# Patient Record
Sex: MALE | Race: WHITE | NOT HISPANIC OR LATINO
[De-identification: names, ages, dates, MRNs, and addresses within clinical notes are randomized per-mention and may not be internally consistent; named-entity substitution may affect disease eponyms.]

---

## 2017-02-15 ENCOUNTER — APPOINTMENT (OUTPATIENT)
Dept: PULMONOLOGY | Facility: CLINIC | Age: 72
End: 2017-02-15

## 2017-02-15 VITALS
DIASTOLIC BLOOD PRESSURE: 70 MMHG | SYSTOLIC BLOOD PRESSURE: 110 MMHG | RESPIRATION RATE: 17 BRPM | BODY MASS INDEX: 28.92 KG/M2 | HEART RATE: 95 BPM | HEIGHT: 70 IN | WEIGHT: 202 LBS | OXYGEN SATURATION: 98 %

## 2017-02-15 RX ORDER — AMOXICILLIN 500 MG/1
500 CAPSULE ORAL
Qty: 20 | Refills: 0 | Status: DISCONTINUED | COMMUNITY
Start: 2016-11-21

## 2017-06-02 ENCOUNTER — FORM ENCOUNTER (OUTPATIENT)
Age: 72
End: 2017-06-02

## 2017-06-03 ENCOUNTER — OUTPATIENT (OUTPATIENT)
Dept: OUTPATIENT SERVICES | Facility: HOSPITAL | Age: 72
LOS: 1 days | End: 2017-06-03
Payer: MEDICARE

## 2017-06-03 ENCOUNTER — APPOINTMENT (OUTPATIENT)
Dept: CT IMAGING | Facility: CLINIC | Age: 72
End: 2017-06-03

## 2017-06-03 DIAGNOSIS — R93.8 ABNORMAL FINDINGS ON DIAGNOSTIC IMAGING OF OTHER SPECIFIED BODY STRUCTURES: ICD-10-CM

## 2017-06-03 PROCEDURE — 71250 CT THORAX DX C-: CPT

## 2017-06-15 ENCOUNTER — APPOINTMENT (OUTPATIENT)
Dept: PULMONOLOGY | Facility: CLINIC | Age: 72
End: 2017-06-15

## 2017-06-15 VITALS
RESPIRATION RATE: 14 BRPM | HEIGHT: 70 IN | HEART RATE: 82 BPM | DIASTOLIC BLOOD PRESSURE: 60 MMHG | WEIGHT: 206 LBS | SYSTOLIC BLOOD PRESSURE: 115 MMHG | OXYGEN SATURATION: 96 % | BODY MASS INDEX: 29.49 KG/M2

## 2017-06-15 VITALS
BODY MASS INDEX: 29.49 KG/M2 | DIASTOLIC BLOOD PRESSURE: 70 MMHG | HEART RATE: 86 BPM | WEIGHT: 206 LBS | SYSTOLIC BLOOD PRESSURE: 120 MMHG | RESPIRATION RATE: 16 BRPM | HEIGHT: 70 IN | OXYGEN SATURATION: 97 %

## 2017-06-15 DIAGNOSIS — J90 PLEURAL EFFUSION, NOT ELSEWHERE CLASSIFIED: ICD-10-CM

## 2017-06-15 RX ORDER — FLUTICASONE PROPIONATE 0.5 MG/G
0.05 CREAM TOPICAL
Qty: 60 | Refills: 0 | Status: ACTIVE | COMMUNITY
Start: 2017-04-24

## 2017-06-15 RX ORDER — METRONIDAZOLE 10 MG/G
1 GEL TOPICAL
Qty: 60 | Refills: 0 | Status: ACTIVE | COMMUNITY
Start: 2017-03-02

## 2017-06-15 RX ORDER — CLOBETASOL PROPIONATE 0.5 MG/G
0.05 CREAM TOPICAL
Qty: 60 | Refills: 0 | Status: ACTIVE | COMMUNITY
Start: 2016-08-30

## 2017-06-15 RX ORDER — ECONAZOLE NITRATE 10 MG/G
1 CREAM TOPICAL
Qty: 30 | Refills: 0 | Status: ACTIVE | COMMUNITY
Start: 2017-05-31

## 2017-06-15 RX ORDER — TOBRAMYCIN 3 MG/G
0.3 OINTMENT OPHTHALMIC
Qty: 4 | Refills: 0 | Status: ACTIVE | COMMUNITY
Start: 2017-03-02

## 2017-08-11 ENCOUNTER — APPOINTMENT (OUTPATIENT)
Dept: ORTHOPEDIC SURGERY | Facility: CLINIC | Age: 72
End: 2017-08-11
Payer: MEDICARE

## 2017-08-11 VITALS
DIASTOLIC BLOOD PRESSURE: 80 MMHG | BODY MASS INDEX: 29.49 KG/M2 | HEIGHT: 70 IN | HEART RATE: 89 BPM | SYSTOLIC BLOOD PRESSURE: 147 MMHG | WEIGHT: 206 LBS

## 2017-08-11 PROCEDURE — 99213 OFFICE O/P EST LOW 20 MIN: CPT

## 2017-11-20 ENCOUNTER — APPOINTMENT (OUTPATIENT)
Dept: PULMONOLOGY | Facility: CLINIC | Age: 72
End: 2017-11-20
Payer: MEDICARE

## 2017-11-20 VITALS
HEART RATE: 79 BPM | DIASTOLIC BLOOD PRESSURE: 76 MMHG | OXYGEN SATURATION: 98 % | SYSTOLIC BLOOD PRESSURE: 120 MMHG | BODY MASS INDEX: 29.49 KG/M2 | WEIGHT: 206 LBS | RESPIRATION RATE: 17 BRPM | HEIGHT: 70 IN

## 2017-11-20 PROCEDURE — 99214 OFFICE O/P EST MOD 30 MIN: CPT | Mod: 25

## 2017-11-20 PROCEDURE — 94010 BREATHING CAPACITY TEST: CPT

## 2018-06-11 ENCOUNTER — APPOINTMENT (OUTPATIENT)
Dept: PULMONOLOGY | Facility: CLINIC | Age: 73
End: 2018-06-11
Payer: MEDICARE

## 2018-06-11 VITALS
HEART RATE: 75 BPM | SYSTOLIC BLOOD PRESSURE: 130 MMHG | DIASTOLIC BLOOD PRESSURE: 70 MMHG | BODY MASS INDEX: 28.92 KG/M2 | RESPIRATION RATE: 16 BRPM | OXYGEN SATURATION: 98 % | WEIGHT: 202 LBS | HEIGHT: 70 IN

## 2018-06-11 PROCEDURE — 94010 BREATHING CAPACITY TEST: CPT

## 2018-06-11 PROCEDURE — 99214 OFFICE O/P EST MOD 30 MIN: CPT | Mod: 25

## 2018-07-25 ENCOUNTER — APPOINTMENT (OUTPATIENT)
Dept: ORTHOPEDIC SURGERY | Facility: CLINIC | Age: 73
End: 2018-07-25
Payer: MEDICARE

## 2018-07-25 PROCEDURE — 20610 DRAIN/INJ JOINT/BURSA W/O US: CPT | Mod: RT

## 2018-08-17 ENCOUNTER — FORM ENCOUNTER (OUTPATIENT)
Age: 73
End: 2018-08-17

## 2018-08-18 ENCOUNTER — APPOINTMENT (OUTPATIENT)
Dept: CT IMAGING | Facility: CLINIC | Age: 73
End: 2018-08-18
Payer: MEDICARE

## 2018-08-18 ENCOUNTER — OUTPATIENT (OUTPATIENT)
Dept: OUTPATIENT SERVICES | Facility: HOSPITAL | Age: 73
LOS: 1 days | End: 2018-08-18
Payer: MEDICARE

## 2018-08-18 DIAGNOSIS — Z00.8 ENCOUNTER FOR OTHER GENERAL EXAMINATION: ICD-10-CM

## 2018-08-18 PROCEDURE — 71250 CT THORAX DX C-: CPT

## 2018-08-18 PROCEDURE — 71250 CT THORAX DX C-: CPT | Mod: 26

## 2018-08-20 ENCOUNTER — APPOINTMENT (OUTPATIENT)
Dept: ORTHOPEDIC SURGERY | Facility: CLINIC | Age: 73
End: 2018-08-20
Payer: MEDICARE

## 2018-08-20 DIAGNOSIS — M54.12 RADICULOPATHY, CERVICAL REGION: ICD-10-CM

## 2018-08-20 DIAGNOSIS — M54.2 CERVICALGIA: ICD-10-CM

## 2018-08-20 PROCEDURE — 99212 OFFICE O/P EST SF 10 MIN: CPT

## 2018-10-09 ENCOUNTER — APPOINTMENT (OUTPATIENT)
Dept: PULMONOLOGY | Facility: CLINIC | Age: 73
End: 2018-10-09
Payer: MEDICARE

## 2018-10-09 VITALS
OXYGEN SATURATION: 96 % | DIASTOLIC BLOOD PRESSURE: 70 MMHG | HEART RATE: 69 BPM | HEIGHT: 70 IN | WEIGHT: 192 LBS | SYSTOLIC BLOOD PRESSURE: 140 MMHG | BODY MASS INDEX: 27.49 KG/M2

## 2018-10-09 PROCEDURE — 99214 OFFICE O/P EST MOD 30 MIN: CPT | Mod: 25

## 2018-10-09 PROCEDURE — 71046 X-RAY EXAM CHEST 2 VIEWS: CPT

## 2018-10-09 RX ORDER — DICLOFENAC SODIUM AND MISOPROSTOL 75; 200 MG/1; UG/1
75-0.2 TABLET, DELAYED RELEASE ORAL
Qty: 30 | Refills: 0 | Status: DISCONTINUED | COMMUNITY
Start: 2018-06-01

## 2018-10-09 RX ORDER — TOBRAMYCIN AND DEXAMETHASONE 3; 1 MG/ML; MG/ML
0.3-0.1 SUSPENSION/ DROPS OPHTHALMIC
Qty: 5 | Refills: 0 | Status: DISCONTINUED | COMMUNITY
Start: 2018-07-17

## 2018-10-09 RX ORDER — CEPHALEXIN 250 MG/1
250 CAPSULE ORAL
Qty: 40 | Refills: 0 | Status: DISCONTINUED | COMMUNITY
Start: 2018-07-18

## 2018-10-09 RX ORDER — MISOPROSTOL 200 UG/1
200 TABLET ORAL
Qty: 60 | Refills: 0 | Status: DISCONTINUED | COMMUNITY
Start: 2018-05-03

## 2018-10-09 NOTE — ASSESSMENT
[FreeTextEntry1] : Mr. Blair is currently in the midst of an acute bronchitis/ asthmatic bronchitis. \par \par Problem : Acute Bronchitis\par - Add Biaxin 500 mg BID (20)\par - You have a clinical scenario most c/q acute bronchitis the etiology of which is unknown but empiric antibiotics are indicated. Hydration, mucolytics including mucinex, robitussin and the like are indicated. Cough controlling agents will be needed. \par \par problem 1: Asthmatic Bronchitis \par - Add a course of prednisone: 20 mg for 7 days then 10 mg for 7 days \par Information sheet given to the patient to be reviewed, this medication is never to be used without consulting the prescribing physician. Proper dietary restraint is necessary specifically salt containing foods, if any reaction may occur should be reported. \par \par -continue to use Symbicort 80 or 160 at 2 inhalations BID\par -Asthma is believed to be caused by inherited (genetic) and environmental factor, but its exact cause is unknown. Asthma may be triggered by allergens, lung infections, or irritants in the air. Asthma triggers are different for each person\par -Inhaler technique reviewed as well as oral hygiene techniques reviewed with patient. Avoidance of cold air, extremes of temperature, rescue inhaler should be used before exercise. Order of medication reviewed with patient. Recommended use of a cool mist humidifier in the bedroom.\par \par problem 2: abnormal chest CT improved c/w inflammation \par -follow up chest CT in May 2018 or 18 months \par -confirmed hernia of the chest consistent with prior lung surgery \par \par problem 3: ? ALINE\par -based on his fatigue, EDS, questionable snoring, and elevated Mallampati class\par -he is being recommended to have an at home sleep study\par -recommended Oxy-Aid or Provent \par -Discussed the risks/associations with coronary artery disease, atrial fibrillation, arrhythmia, memory loss, issues with concentration, stroke risk, hypertension, nocturia, chronic reflux/Madera’s esophagus some but not all inclusive. Treatment options discussed including CPAP/BiPAP machine, oral appliance, ProVent therapy, Oxy-Aid by Respitec, new technologies, or positional sleep.\par -Treatment options discussed including CPAP/BiPAP machine, oral appliance, ProVent therapy, Oxy-Aid by Respitec, new technologies, or positional sleep.Recommended use of the CPAP machine for moderate (AHI >15), moderate to severe (AHI 15-30) and severe patients (AHI > 30). Recommended weight loss which can reduce AHI especially in weight loss of greater than 5% of BMI. Positional sleep is recommended in those with low AHI, low-moderate BMI, and younger age. For severe sleep apnea, the hypoglossal nerve stimulator was recommended as well.\par \par problem 4: allergic rhinitis \par -continue to use Astelin .15 1 sniff each nostril BID\par -continue to use OTC antihistamines \par \par -Environmental measures for allergies were encouraged including mattress and pillow cover, air purifier, and environmental controls.\par \par problem 5: overweight\par -Weight loss, exercise, and diet control were discussed and are highly encouraged. Treatment options were given such as, aqua therapy, and contacting a nutritionist. Recommended to use the elliptical, stationary bike, less use of treadmill. Obesity is associated with worsening asthma, shortness of breath, and potential for cardiac disease, diabetes, and other underlying medical conditions.\par \par problem 6: GERD\par -continue to use Omeprazole 40 mg before breakfast\par - He has had to balance between NSAID and Arthrotec \par -Rule of 2s: avoid eating too much, eating too late, eating too spicy, eating two hours before bed\par -Things to avoid including overeating, spicy foods, tight clothing, eating within three hours of bed, this list is not all inclusive. \par -For treatment of reflux, possible options discussed including diet control, H2 blockers, PPIs, as well as coating motility agents discussed as treatment options. Timing of meals and proximity of last meal to sleep were discussed. If symptoms persist, a formal gastrointestinal evaluation is needed. \par \par problem 7: chest indentation\par -felt to be related to prior surgery\par -he is being recommended to have cardiac therapy\par \par problem 8: health maintenance \par -recommended yearly flu shot after October 15\par -recommended strep pneumonia vaccines: Prevnar-13 vaccine, followed by Pneumo vaccine 23 one year following\par -recommended early intervention for URIs\par -recommended regular osteoporosis evaluations\par -recommended early dermatological evaluations\par -recommended after the age of 50 to the age of 70, colonoscopy every 5 years \par \par problem 9: poor sleep hygiene\par -recommended to wear sunglasses 30 minutes before bed \par -Good sleep hygiene was encouraged including avoiding watching television an hour before bed, keeping caffeine at a low,  avoiding reading, television, or anything, in bed, no drinking any liquids three hours before bedtime, and only getting into bed when tired and ready for sleep. \par \par F/U in 3 months\par He is encouraged to call with any changes, concerns, or questions. \par

## 2018-10-09 NOTE — PHYSICAL EXAM

## 2018-10-09 NOTE — ADDENDUM
[FreeTextEntry1] : Documented by Arnav Avila acting as a scribe for Dr. Sanford Muse on 10/9/18\par \par All medical record entries made by the Scribe were at my, Dr. Sanford Muse's, direction and personally dictated by me on 10/9/18. I have reviewed the chart and agree that the record accurately reflects my personal performance of the history, physical exam, assessment and plan. I have also personally directed, reviewed, and agree with the discharge instructions. \par \par \par \par \par

## 2018-10-09 NOTE — PROCEDURE
[FreeTextEntry1] : CXR revealed a normal sized heart (s/p OHS); there was no evidence of infiltrate or effusion.

## 2018-12-12 ENCOUNTER — APPOINTMENT (OUTPATIENT)
Dept: PULMONOLOGY | Facility: CLINIC | Age: 73
End: 2018-12-12
Payer: MEDICARE

## 2018-12-12 ENCOUNTER — NON-APPOINTMENT (OUTPATIENT)
Age: 73
End: 2018-12-12

## 2018-12-12 VITALS
SYSTOLIC BLOOD PRESSURE: 140 MMHG | RESPIRATION RATE: 16 BRPM | OXYGEN SATURATION: 99 % | BODY MASS INDEX: 26.88 KG/M2 | DIASTOLIC BLOOD PRESSURE: 70 MMHG | HEART RATE: 59 BPM | HEIGHT: 71 IN | WEIGHT: 192 LBS

## 2018-12-12 PROCEDURE — 99214 OFFICE O/P EST MOD 30 MIN: CPT | Mod: 25

## 2018-12-12 PROCEDURE — 94010 BREATHING CAPACITY TEST: CPT

## 2018-12-12 RX ORDER — PREDNISONE 10 MG/1
10 TABLET ORAL
Qty: 50 | Refills: 0 | Status: DISCONTINUED | COMMUNITY
Start: 2018-10-09 | End: 2018-12-12

## 2018-12-12 RX ORDER — CLARITHROMYCIN 500 MG/1
500 TABLET, FILM COATED ORAL
Qty: 20 | Refills: 0 | Status: DISCONTINUED | COMMUNITY
Start: 2018-10-09 | End: 2018-12-12

## 2018-12-12 NOTE — ASSESSMENT
[FreeTextEntry1] : Mr. Blair is s/p acute bronchitis/ asthmatic bronchitis, with histry of asthma, Allergy, GERD, ?OSAS - now stable. \par \par problem 1: Asthma\par -continue to use Symbicort 80 or 160 at 2 inhalations BID\par -Asthma is believed to be caused by inherited (genetic) and environmental factor, but its exact cause is unknown. Asthma may be triggered by allergens, lung infections, or irritants in the air. Asthma triggers are different for each person\par -Inhaler technique reviewed as well as oral hygiene techniques reviewed with patient. Avoidance of cold air, extremes of temperature, rescue inhaler should be used before exercise. Order of medication reviewed with patient. Recommended use of a cool mist humidifier in the bedroom.\par \par problem 2: abnormal chest CT improved c/w inflammation \par -follow up chest CT in May 2019 or 18 months \par -confirmed hernia of the chest consistent with prior lung surgery \par \par problem 3: ? ALINE\par -based on his fatigue, EDS, questionable snoring, and elevated Mallampati class\par -he is being recommended to have an at home sleep study\par -recommended Oxy-Aid or Provent \par -Discussed the risks/associations with coronary artery disease, atrial fibrillation, arrhythmia, memory loss, issues with concentration, stroke risk, hypertension, nocturia, chronic reflux/Madera’s esophagus some but not all inclusive. Treatment options discussed including CPAP/BiPAP machine, oral appliance, ProVent therapy, Oxy-Aid by Respitec, new technologies, or positional sleep.\par -Treatment options discussed including CPAP/BiPAP machine, oral appliance, ProVent therapy, Oxy-Aid by Respitec, new technologies, or positional sleep.Recommended use of the CPAP machine for moderate (AHI >15), moderate to severe (AHI 15-30) and severe patients (AHI > 30). Recommended weight loss which can reduce AHI especially in weight loss of greater than 5% of BMI. Positional sleep is recommended in those with low AHI, low-moderate BMI, and younger age. For severe sleep apnea, the hypoglossal nerve stimulator was recommended as well.\par \par problem 4: allergic rhinitis \par -continue to use Astelin .15 1 sniff each nostril BID\par -continue to use OTC antihistamines \par \par -Environmental measures for allergies were encouraged including mattress and pillow cover, air purifier, and environmental controls.\par \par problem 5: overweight\par -Weight loss, exercise, and diet control were discussed and are highly encouraged. Treatment options were given such as, aqua therapy, and contacting a nutritionist. Recommended to use the elliptical, stationary bike, less use of treadmill. Obesity is associated with worsening asthma, shortness of breath, and potential for cardiac disease, diabetes, and other underlying medical conditions.\par \par problem 6: GERD\par -continue to use Omeprazole 40 mg before breakfast\par - He has had to balance between NSAID and Arthrotec \par -Rule of 2s: avoid eating too much, eating too late, eating too spicy, eating two hours before bed\par -Things to avoid including overeating, spicy foods, tight clothing, eating within three hours of bed, this list is not all inclusive. \par -For treatment of reflux, possible options discussed including diet control, H2 blockers, PPIs, as well as coating motility agents discussed as treatment options. Timing of meals and proximity of last meal to sleep were discussed. If symptoms persist, a formal gastrointestinal evaluation is needed. \par \par problem 7: chest indentation\par -felt to be related to prior surgery\par -he is being recommended to have cardiac therapy\par \par problem 8: health maintenance \par -recommended yearly flu shot after October 2018\par -recommended strep pneumonia vaccines: Prevnar-13 vaccine, followed by Pneumo vaccine 23 one year following\par -recommended early intervention for URIs\par -recommended regular osteoporosis evaluations\par -recommended early dermatological evaluations\par -recommended after the age of 50 to the age of 70, colonoscopy every 5 years \par \par problem 9: poor sleep hygiene\par -recommended to wear sunglasses 30 minutes before bed \par -Good sleep hygiene was encouraged including avoiding watching television an hour before bed, keeping caffeine at a low,  avoiding reading, television, or anything, in bed, no drinking any liquids three hours before bedtime, and only getting into bed when tired and ready for sleep. \par \par F/U in 3 months\par He is encouraged to call with any changes, concerns, or questions. \par

## 2018-12-12 NOTE — PHYSICAL EXAM

## 2018-12-12 NOTE — PROCEDURE
[FreeTextEntry1] : PFT- spi reveals mild restrictive dysfunction; FEV1 of 2.62 L which is 77% of predicted; normal flow volume loop.\par

## 2018-12-12 NOTE — HISTORY OF PRESENT ILLNESS
[FreeTextEntry1] : Mr. Blair is a 72 year old male with a history of abnormal chest CT, allergies, asthma, and chronic cough presenting to the office today for a sick visit . His chief complaint is GI discomfort. \par - He states in Oct 2018 he has two episodes of vomiting containing bile, was evaluated, per him he thought it was related to his gallbladder. He is inquiring whether his symptoms were relates to his bronchitis. \par - He notes feeling well generally. \par - He reports marijuana therapy did not help. \par - He states that he feels 10 years older\par - His GERd symptoms are under control with Omeprazole. \par - He notes he was walking regularly before he began feeling ill in Oct. \par - He denies any headaches, nausea, vomiting, fever, chills, sweats, chest pain, chest pressure, diarrhea, constipation, dysphagia, dizziness, leg swelling, leg pain, itchy eyes, itchy ears, heartburn, reflux, or sour taste in the mouth.\par

## 2018-12-12 NOTE — REASON FOR VISIT
[Acute] : an acute visit [FreeTextEntry1] : s/p Acute Bronchitis/ Asthmatic Bronchitis. - now fine.

## 2018-12-12 NOTE — ADDENDUM
[FreeTextEntry1] : Documented by Sandra Eldridge acting as a scribe for Dr. Sanford Muse on 12/12/18.\par \par All medical record entries made by the scribe, Sandra Eldridge, were at my, Dr. Sanford Muse's, direction and personally dictated by me on 12/12/18. I have reviewed the chart and agree that the record accurately reflects my personal performance of the history, physical exam, assessment and plan. I have also personally directed, reviewed, and agree with the discharge instructions.\par

## 2019-05-07 ENCOUNTER — NON-APPOINTMENT (OUTPATIENT)
Age: 74
End: 2019-05-07

## 2019-05-07 ENCOUNTER — APPOINTMENT (OUTPATIENT)
Dept: PULMONOLOGY | Facility: CLINIC | Age: 74
End: 2019-05-07
Payer: MEDICARE

## 2019-05-07 VITALS
WEIGHT: 198 LBS | OXYGEN SATURATION: 96 % | SYSTOLIC BLOOD PRESSURE: 120 MMHG | HEIGHT: 71 IN | BODY MASS INDEX: 27.72 KG/M2 | HEART RATE: 63 BPM | DIASTOLIC BLOOD PRESSURE: 60 MMHG | RESPIRATION RATE: 14 BRPM

## 2019-05-07 PROCEDURE — 94010 BREATHING CAPACITY TEST: CPT

## 2019-05-07 PROCEDURE — 99214 OFFICE O/P EST MOD 30 MIN: CPT | Mod: 25

## 2019-05-07 NOTE — PROCEDURE
[FreeTextEntry1] : PFT- spi reveals normal flows; FEV1 was 2.69L which is 80% of predicted; normal flow volume loop

## 2019-05-07 NOTE — ADDENDUM
[FreeTextEntry1] : Documented by Arnav Avila acting as a scribe for Dr. Sanford Muse on 5/7/2019\par \par All medical record entries made by the Scribe were at my, Dr. Sanford Muse's, direction and personally dictated by me on 5/7/2019. I have reviewed the chart and agree that the record accurately reflects my personal performance of the history, physical exam, assessment and plan. I have also personally directed, reviewed, and agree with the discharge instructions. \par \par \par \par \par

## 2019-05-07 NOTE — ASSESSMENT
[FreeTextEntry1] : Mr. Blair has a history of HTN, cervical radiculopathy, asthma, allergy, GERD, and ?OSAS. He is currently stable from a pulmonary perspective. \par \par problem 1: Asthma\par -continue to use Symbicort 80 or 160 at 2 inhalations BID\par -Asthma is believed to be caused by inherited (genetic) and environmental factor, but its exact cause is unknown. Asthma may be triggered by allergens, lung infections, or irritants in the air. Asthma triggers are different for each person\par -Inhaler technique reviewed as well as oral hygiene techniques reviewed with patient. Avoidance of cold air, extremes of temperature, rescue inhaler should be used before exercise. Order of medication reviewed with patient. Recommended use of a cool mist humidifier in the bedroom.\par \par problem 2: abnormal chest CT improved c/w inflammation \par -follow up chest CT in 10/2019\par -confirmed hernia of the chest consistent with prior lung surgery \par \par problem 3: ? ALINE\par -based on his fatigue, EDS, questionable snoring, and elevated Mallampati class\par -he is being recommended to have an at home sleep study\par -recommended Oxy-Aid or Provent \par -Discussed the risks/associations with coronary artery disease, atrial fibrillation, arrhythmia, memory loss, issues with concentration, stroke risk, hypertension, nocturia, chronic reflux/[place]era’s esophagus some but not all inclusive. Treatment options discussed including CPAP/BiPAP machine, oral appliance, ProVent therapy, Oxy-Aid by Respitec, new technologies, or positional sleep.\par -Treatment options discussed including CPAP/BiPAP machine, oral appliance, ProVent therapy, Oxy-Aid by Respitec, new technologies, or positional sleep.Recommended use of the CPAP machine for moderate (AHI >15), moderate to severe (AHI 15-30) and severe patients (AHI > 30). Recommended weight loss which can reduce AHI especially in weight loss of greater than 5% of BMI. Positional sleep is recommended in those with low AHI, low-moderate BMI, and younger age. For severe sleep apnea, the hypoglossal nerve stimulator was recommended as well.\par \par problem 4: allergic rhinitis \par -continue to use Astelin .15 1 sniff each nostril BID\par -continue to use OTC antihistamines \par \par -Environmental measures for allergies were encouraged including mattress and pillow cover, air purifier, and environmental controls.\par \par problem 5: overweight\par -Weight loss, exercise, and diet control were discussed and are highly encouraged. Treatment options were given such as, aqua therapy, and contacting a nutritionist. Recommended to use the elliptical, stationary bike, less use of treadmill. Obesity is associated with worsening asthma, shortness of breath, and potential for cardiac disease, diabetes, and other underlying medical conditions.\par \par problem 6: GERD\par -continue to use Omeprazole 40 mg before breakfast\par - He has had to balance between NSAID and Arthrotec \par -Rule of 2s: avoid eating too much, eating too late, eating too spicy, eating two hours before bed\par -Things to avoid including overeating, spicy foods, tight clothing, eating within three hours of bed, this list is not all inclusive. \par -For treatment of reflux, possible options discussed including diet control, H2 blockers, PPIs, as well as coating motility agents discussed as treatment options. Timing of meals and proximity of last meal to sleep were discussed. If symptoms persist, a formal gastrointestinal evaluation is needed. \par \par problem 7: chest indentation\par -felt to be related to prior surgery\par -he is being recommended to have cardiac therapy\par \par problem 8: health maintenance \par -recommended yearly flu shot after October 2018\par -recommended strep pneumonia vaccines: Prevnar-13 vaccine, followed by Pneumo vaccine 23 one year following\par -recommended early intervention for URIs\par -recommended regular osteoporosis evaluations\par -recommended early dermatological evaluations\par -recommended after the age of 50 to the age of 70, colonoscopy every 5 years \par \par problem 9: poor sleep hygiene\par -recommended to wear sunglasses 30 minutes before bed \par -Good sleep hygiene was encouraged including avoiding watching television an hour before bed, keeping caffeine at a low,  avoiding reading, television, or anything, in bed, no drinking any liquids three hours before bedtime, and only getting into bed when tired and ready for sleep. \par \par F/U in 4 months\par He is encouraged to call with any changes, concerns, or questions. \par

## 2019-05-07 NOTE — PHYSICAL EXAM
[General Appearance - Well Developed] : well developed [Normal Appearance] : normal appearance [Well Groomed] : well groomed [No Deformities] : no deformities [General Appearance - In No Acute Distress] : no acute distress [General Appearance - Well Nourished] : well nourished [Eyelids - No Xanthelasma] : the eyelids demonstrated no xanthelasmas [Normal Conjunctiva] : the conjunctiva exhibited no abnormalities [Normal Oropharynx] : normal oropharynx [III] : III [Neck Cervical Mass (___cm)] : no neck mass was observed [Neck Appearance] : the appearance of the neck was normal [Jugular Venous Distention Increased] : there was no jugular-venous distention [Thyroid Diffuse Enlargement] : the thyroid was not enlarged [Heart Rate And Rhythm] : heart rate and rhythm were normal [Thyroid Nodule] : there were no palpable thyroid nodules [Heart Sounds] : normal S1 and S2 [Respiration, Rhythm And Depth] : normal respiratory rhythm and effort [Exaggerated Use Of Accessory Muscles For Inspiration] : no accessory muscle use [Abdomen Tenderness] : non-tender [Abdomen Soft] : soft [Abdomen Mass (___ Cm)] : no abdominal mass palpated [Abnormal Walk] : normal gait [Nail Clubbing] : no clubbing of the fingernails [Gait - Sufficient For Exercise Testing] : the gait was sufficient for exercise testing [Petechial Hemorrhages (___cm)] : no petechial hemorrhages [Cyanosis, Localized] : no localized cyanosis [Skin Color & Pigmentation] : normal skin color and pigmentation [No Venous Stasis] : no venous stasis [] : no rash [Skin Lesions] : no skin lesions [No Xanthoma] : no  xanthoma was observed [No Skin Ulcers] : no skin ulcer [Sensation] : the sensory exam was normal to light touch and pinprick [Deep Tendon Reflexes (DTR)] : deep tendon reflexes were 2+ and symmetric [Oriented To Time, Place, And Person] : oriented to person, place, and time [No Focal Deficits] : no focal deficits [Impaired Insight] : insight and judgment were intact [Affect] : the affect was normal [Murmurs] : no murmurs present [FreeTextEntry1] : I:E 1:3; clear  [FreeTextEntry2] : thinning of the palmar muscles.

## 2019-05-07 NOTE — HISTORY OF PRESENT ILLNESS
[FreeTextEntry1] : Mr. Blair is a 73 year old male with a history of asthma, allergy, GERD, abnormal CT, ?OSAS presenting to the office today for a follow up visit . His chief complaint is total body pain. \par - He comes in stating that he has been feeling terrible\par - He recently tried taking marijuana pills but did not find that it helped. \par - He states that he feels 20 years older since discontinuing diclofenac. Since discontinuing his diclofenac, he has been having body aches and pains and has not been able to ambulate as well as possible. His posture has also worsened in response\par - Around 3 weeks ago, while sleeping he woke up and during a transition from the supine position to a standing position he began to collapse. He was able to correct his fall though he continued to be unable to stand without assistance. He notes, though, he felt nauseous the next day. \par - He notes that he had these episodes in the past, which was treated with Zolofot.

## 2019-10-11 ENCOUNTER — FORM ENCOUNTER (OUTPATIENT)
Age: 74
End: 2019-10-11

## 2019-10-12 ENCOUNTER — APPOINTMENT (OUTPATIENT)
Dept: CT IMAGING | Facility: CLINIC | Age: 74
End: 2019-10-12
Payer: MEDICARE

## 2019-10-12 ENCOUNTER — OUTPATIENT (OUTPATIENT)
Dept: OUTPATIENT SERVICES | Facility: HOSPITAL | Age: 74
LOS: 1 days | End: 2019-10-12
Payer: MEDICARE

## 2019-10-12 DIAGNOSIS — R93.89 ABNORMAL FINDINGS ON DIAGNOSTIC IMAGING OF OTHER SPECIFIED BODY STRUCTURES: ICD-10-CM

## 2019-10-12 PROCEDURE — 71250 CT THORAX DX C-: CPT

## 2019-10-12 PROCEDURE — 71250 CT THORAX DX C-: CPT | Mod: 26

## 2019-11-04 ENCOUNTER — APPOINTMENT (OUTPATIENT)
Dept: PULMONOLOGY | Facility: CLINIC | Age: 74
End: 2019-11-04
Payer: MEDICARE

## 2019-11-04 ENCOUNTER — NON-APPOINTMENT (OUTPATIENT)
Age: 74
End: 2019-11-04

## 2019-11-04 VITALS
HEIGHT: 70 IN | RESPIRATION RATE: 17 BRPM | BODY MASS INDEX: 28.2 KG/M2 | OXYGEN SATURATION: 98 % | DIASTOLIC BLOOD PRESSURE: 70 MMHG | HEART RATE: 54 BPM | SYSTOLIC BLOOD PRESSURE: 130 MMHG | WEIGHT: 197 LBS

## 2019-11-04 PROCEDURE — 94010 BREATHING CAPACITY TEST: CPT

## 2019-11-04 PROCEDURE — 99214 OFFICE O/P EST MOD 30 MIN: CPT | Mod: 25

## 2019-11-04 RX ORDER — NAPROXEN 500 MG/1
500 TABLET, DELAYED RELEASE ORAL
Qty: 60 | Refills: 0 | Status: DISCONTINUED | COMMUNITY
Start: 2017-05-05 | End: 2019-11-04

## 2019-11-04 RX ORDER — NAPROXEN 500 MG/1
500 TABLET ORAL
Qty: 60 | Refills: 0 | Status: DISCONTINUED | COMMUNITY
Start: 2017-04-19 | End: 2019-11-04

## 2019-11-04 RX ORDER — OMEPRAZOLE 20 MG/1
20 CAPSULE, DELAYED RELEASE ORAL
Qty: 60 | Refills: 0 | Status: DISCONTINUED | COMMUNITY
Start: 2018-06-13 | End: 2019-11-04

## 2019-11-04 RX ORDER — SUCRALFATE 1 G/1
1 TABLET ORAL
Qty: 120 | Refills: 3 | Status: ACTIVE | COMMUNITY
Start: 2019-11-04 | End: 1900-01-01

## 2019-11-04 NOTE — ADDENDUM
[FreeTextEntry1] : Documented by Catherine Garcia acting as a scribe for Dr. Sanford Muse on 11/04/2019 \par \par All medical record entries made by the Scribe were at my, Dr. Sanford Muse's, direction and personally dictated by me on 11/04/2019 . I have reviewed the chart and agree that the record accurately reflects my personal performance of the history, physical exam, assessment and plan. I have also personally directed, reviewed, and agree with the discharge instructions.\par

## 2019-11-04 NOTE — PROCEDURE
[FreeTextEntry1] : PFT revealed normal flows, with a FEV1 of 2.56 L, which is 76 % of predicted, with a normal flow volume loop. \par \par Rossy: Chest CT (10/12/19) reveals stable 4 mm left upper lobe pulmonary nodule when compared to prior studies dating back to May \par 2016. No new nodules.\par

## 2019-11-04 NOTE — HISTORY OF PRESENT ILLNESS
[FreeTextEntry1] : Mr. Blair is a 73 year old male with a history of asthma, allergy, GERD, abnormal CT, ?OSAS presenting to the office today for a follow up visit . His chief complaint is\par \par - He states that his annual CT scan recently and he is not satisfied with the report \par - He recently had a lubricant shot for his arthritic pain \par - He is currently off the Omeprazole\par - Triglycerides are elevated. \par - He used marijuana in the past to alleviate his pain. \par - He states she has pain from head to toe especially with his back. \par - he currently has orthostatic hypotension \par - He has a dry mouth side effect as a side effect result of his medication \par - His dizziness is well controlled by his medication \par - He states that he has been wheezing and coughing intermittently \par - He states that he was given a  by his dentist for his sleep apnea \par - He states that he has some irritation on his frenulum as a result of the mouth guard\par \par - denies any headaches, nausea, vomiting, fever, chills, sweats, chest pain, chest pressure, diarrhea, constipation, dysphagia,  leg swelling, leg pain, itchy eyes, itchy ears, heartburn, reflux, or sour taste in the mouth.\par

## 2019-11-04 NOTE — ASSESSMENT
[FreeTextEntry1] : Mr. Blair has a history of HTN, cervical radiculopathy, asthma, allergy, GERD, and ?OSAS. He is currently plagued by body pain, intermittent cough/wheezing/ dry mouth. \par \par problem 1: Asthma\par -continue to use Symbicort 80 or 160 at 2 inhalations BID\par -Asthma is believed to be caused by inherited (genetic) and environmental factor, but its exact cause is unknown. Asthma may be triggered by allergens, lung infections, or irritants in the air. Asthma triggers are different for each person\par -Inhaler technique reviewed as well as oral hygiene techniques reviewed with patient. Avoidance of cold air, extremes of temperature, rescue inhaler should be used before exercise. Order of medication reviewed with patient. Recommended use of a cool mist humidifier in the bedroom.\par \par problem 2: abnormal chest CT improved c/w inflammation \par -follow up chest CT in 10/2020\par -confirmed hernia of the chest consistent with prior lung surgery \par \par problem 3: ? ALINE\par -based on his fatigue, EDS, questionable snoring, and elevated Mallampati class\par -he is being recommended to have an at home sleep study\par -recommended Oxy-Aid or Provent \par -Discussed the risks/associations with coronary artery disease, atrial fibrillation, arrhythmia, memory loss, issues with concentration, stroke risk, hypertension, nocturia, chronic reflux/Madera’s esophagus some but not all inclusive. Treatment options discussed including CPAP/BiPAP machine, oral appliance, ProVent therapy, Oxy-Aid by [employer]pite[employer], new technologies, or positional sleep.\par -Treatment options discussed including CPAP/BiPAP machine, oral appliance, ProVent therapy, Oxy-Aid by [employer]pite[employer], new technologies, or positional sleep.Recommended use of the CPAP machine for moderate (AHI >15), moderate to severe (AHI 15-30) and severe patients (AHI > 30). Recommended weight loss which can reduce AHI especially in weight loss of greater than 5% of BMI. Positional sleep is recommended in those with low AHI, low-moderate BMI, and younger age. For severe sleep apnea, the hypoglossal nerve stimulator was recommended as well.\par \par problem 4: allergic rhinitis \par -continue to use Astelin .15 1 sniff each nostril BID\par -continue to use OTC antihistamines \par \par -Environmental measures for allergies were encouraged including mattress and pillow cover, air purifier, and environmental controls.\par \par problem 5: overweight\par -Weight loss, exercise, and diet control were discussed and are highly encouraged. Treatment options were given such as, aqua therapy, and contacting a nutritionist. Recommended to use the elliptical, stationary bike, less use of treadmill. Obesity is associated with worsening asthma, shortness of breath, and potential for cardiac disease, diabetes, and other underlying medical conditions.\par \par problem 6: GERD\par -continue to use Pepcid Complete 40 mg QHS\par - add Carafate 1pill  pre-meal and qhs \par -Rule of 2s: avoid eating too much, eating too late, eating too spicy, eating two hours before bed\par -Things to avoid including overeating, spicy foods, tight clothing, eating within three hours of bed, this list is not all inclusive. \par -For treatment of reflux, possible options discussed including diet control, H2 blockers, PPIs, as well as coating motility agents discussed as treatment options. Timing of meals and proximity of last meal to sleep were discussed. If symptoms persist, a formal gastrointestinal evaluation is needed. \par \par problem 7: chest indentation\par -felt to be related to prior surgery\par -he is being recommended to have cardiac therapy\par \par problem 8: health maintenance \par -recommended yearly flu shot after October 2018 - refused \par -recommended strep pneumonia vaccines: Prevnar-13 vaccine, followed by Pneumo vaccine 23 one year following\par -recommended early intervention for URIs\par -recommended regular osteoporosis evaluations\par -recommended early dermatological evaluations\par -recommended after the age of 50 to the age of 70, colonoscopy every 5 years \par \par problem 9: poor sleep hygiene ( in place) \par -recommended to wear sunglasses 30 minutes before bed \par -Good sleep hygiene was encouraged including avoiding watching television an hour before bed, keeping caffeine at a low,  avoiding reading, television, or anything, in bed, no drinking any liquids three hours before bedtime, and only getting into bed when tired and ready for sleep. \par \par F/U in 4 months\par He is encouraged to call with any changes, concerns, or questions. \par

## 2019-11-04 NOTE — PHYSICAL EXAM

## 2019-12-11 ENCOUNTER — APPOINTMENT (OUTPATIENT)
Dept: ORTHOPEDIC SURGERY | Facility: CLINIC | Age: 74
End: 2019-12-11
Payer: MEDICARE

## 2019-12-11 PROCEDURE — 99213 OFFICE O/P EST LOW 20 MIN: CPT

## 2020-01-29 ENCOUNTER — APPOINTMENT (OUTPATIENT)
Dept: ORTHOPEDIC SURGERY | Facility: CLINIC | Age: 75
End: 2020-01-29
Payer: MEDICARE

## 2020-01-29 PROCEDURE — 20610 DRAIN/INJ JOINT/BURSA W/O US: CPT | Mod: RT

## 2020-01-29 PROCEDURE — 99213 OFFICE O/P EST LOW 20 MIN: CPT | Mod: 25

## 2020-04-20 ENCOUNTER — APPOINTMENT (OUTPATIENT)
Dept: PULMONOLOGY | Facility: CLINIC | Age: 75
End: 2020-04-20
Payer: MEDICARE

## 2020-04-20 PROCEDURE — 99214 OFFICE O/P EST MOD 30 MIN: CPT | Mod: 95

## 2020-04-20 RX ORDER — FLUTICASONE PROPIONATE AND SALMETEROL 250; 50 UG/1; UG/1
250-50 POWDER RESPIRATORY (INHALATION)
Qty: 1 | Refills: 1 | Status: ACTIVE | COMMUNITY
Start: 2020-04-20 | End: 1900-01-01

## 2020-04-20 NOTE — REASON FOR VISIT
[Follow-Up] : a follow-up visit [FreeTextEntry1] : Video Telehealth - Asthma, allergy, GERD, abnormal CT, ?OSAS

## 2020-04-20 NOTE — ASSESSMENT
[FreeTextEntry1] : Mr. Blair has a history of HTN, cervical radiculopathy, asthma, allergy, GERD, and ?OSAS. He is currently plagued by body pain, intermittent cough/Post Nasal Drip Syndrome. \par \par problem 1: Asthma\par -Add Generic Advair 232 1 inhalation BID\par -Asthma is believed to be caused by inherited (genetic) and environmental factor, but its exact cause is unknown. Asthma may be triggered by allergens, lung infections, or irritants in the air. Asthma triggers are different for each person\par -Inhaler technique reviewed as well as oral hygiene techniques reviewed with patient. Avoidance of cold air, extremes of temperature, rescue inhaler should be used before exercise. Order of medication reviewed with patient. Recommended use of a cool mist humidifier in the bedroom.\par \par problem 2: abnormal chest CT improved c/w inflammation \par -follow up chest CT in 10/2020\par -confirmed hernia of the chest consistent with prior lung surgery \par \par problem 3: ? ALINE\par -based on his fatigue, EDS, questionable snoring, and elevated Mallampati class\par -he is being recommended to have an at home sleep study\par -recommended Oxy-Aid or Provent \par -Discussed the risks/associations with coronary artery disease, atrial fibrillation, arrhythmia, memory loss, issues with concentration, stroke risk, hypertension, nocturia, chronic reflux/Madera’s esophagus some but not all inclusive. Treatment options discussed including CPAP/BiPAP machine, oral appliance, ProVent therapy, Oxy-Aid by [employer]pite[employer], new technologies, or positional sleep.\par -Treatment options discussed including CPAP/BiPAP machine, oral appliance, ProVent therapy, Oxy-Aid by [employer]pite[employer], new technologies, or positional sleep.Recommended use of the CPAP machine for moderate (AHI >15), moderate to severe (AHI 15-30) and severe patients (AHI > 30). Recommended weight loss which can reduce AHI especially in weight loss of greater than 5% of BMI. Positional sleep is recommended in those with low AHI, low-moderate BMI, and younger age. For severe sleep apnea, the hypoglossal nerve stimulator was recommended as well.\par \par problem 4: allergic rhinitis \par -continue to use Astelin .15 1 sniff each nostril BID\par -continue to use OTC antihistamines / Clarinex 5mg QHS \par \par -Environmental measures for allergies were encouraged including mattress and pillow cover, air purifier, and environmental controls.\par \par problem 5: overweight\par -Weight loss, exercise, and diet control were discussed and are highly encouraged. Treatment options were given such as, aqua therapy, and contacting a nutritionist. Recommended to use the elliptical, stationary bike, less use of treadmill. Obesity is associated with worsening asthma, shortness of breath, and potential for cardiac disease, diabetes, and other underlying medical conditions.\par \par problem 6: GERD\par -continue to use Pepcid 40 mg QHS\par -continue Carafate 1pill  pre-meal and qhs \par -Rule of 2s: avoid eating too much, eating too late, eating too spicy, eating two hours before bed\par -Things to avoid including overeating, spicy foods, tight clothing, eating within three hours of bed, this list is not all inclusive. \par -For treatment of reflux, possible options discussed including diet control, H2 blockers, PPIs, as well as coating motility agents discussed as treatment options. Timing of meals and proximity of last meal to sleep were discussed. If symptoms persist, a formal gastrointestinal evaluation is needed. \par \par problem 7: chest indentation\par -felt to be related to prior surgery\par -he is being recommended to have cardiac therapy\par \par Problem 8: Health Maintenance/COVID19 Precautions:\par - Clean your hands often. Wash your hands often with soap and water for at least 20 seconds, especially after blowing your nose, coughing, or sneezing, or having been in a public place.\par - If soap and water are not available, use a hand  that contains at least 60% alcohol.\par - To the extent possible, avoid touching high-touch surfaces in public places - elevator buttons, door handles, handrails, handshaking with people, etc. Use a tissue or your sleeve to cover your hand or finger if you must touch something.\par - Wash your hands after touching surfaces in public places.\par - Avoid touching your face, nose, eyes, etc.\par - Clean and disinfect your home to remove germs: practice routine cleaning of frequently touched surfaces (for example: tables, doorknobs, light switches, handles, desks, toilets, faucets, sinks & cell phones)\par - Avoid crowds, especially in poorly ventilated spaces. Your risk of exposure to respiratory viruses like COVID-19 may increase in crowded, closed-in settings with little air circulation if\par there are people in the crowd who are sick. All patients are recommended to practice social distancing and stay at least 6 feet away from others.\par - Avoid all non-essential travel including plane trips, and especially avoid embarking on cruise ships.\par -If COVID-19 is spreading in your community, take extra measures to put distance between yourself and other people to further reduce your risk of being exposed to this new virus.\par -Stay home as much as possible.\par - Consider ways of getting food brought to your house through family, social, or commercial networks\par -Be aware that the virus has been known to live in the air up to 3 hours post exposure, cardboard up to 24 hours post exposure, copper up to 4 hours post exposure, steel and plastic up to 2-3 days post exposure. Risk of transmission from these surfaces are affected by many variables.\par \par Immune Support Recommendations:\par -OTC Vitamin C 500mg BID \par -OTC Quercetin 250-500mg BID \par -OTC Zinc 75-100mg per day \par -OTC Melatonin 1or 2mg a night \par -OTC Vitamin D 1-4000mg per day\par -Tonic Water 8oz\par -Recommended to Stay Hydrated (At least a gallon/day)\par \par Asthma and COVID19:\par You need to make sure your asthma is under control. This often requires the use of inhaled corticosteroids (and sometimes oral corticosteroids). Inhaled corticosteroids do not likely reduce your immune system’s ability to fight infections, but oral corticosteroids may. It is important to use the steps above to protect yourself to limit your exposure to any respiratory virus. \par \par problem 9: health maintenance \par -recommended yearly flu shot after October 2018 - refused \par -recommended strep pneumonia vaccines: Prevnar-13 vaccine, followed by Pneumo vaccine 23 one year following\par -recommended early intervention for URIs\par -recommended regular osteoporosis evaluations\par -recommended early dermatological evaluations\par -recommended after the age of 50 to the age of 70, colonoscopy every 5 years \par \par problem 9: poor sleep hygiene ( in place) \par -recommended to wear sunglasses 30 minutes before bed \par -Good sleep hygiene was encouraged including avoiding watching television an hour before bed, keeping caffeine at a low,  avoiding reading, television, or anything, in bed, no drinking any liquids three hours before bedtime, and only getting into bed when tired and ready for sleep. \par \par F/U in 4 months\par He is encouraged to call with any changes, concerns, or questions. \par

## 2020-04-20 NOTE — HISTORY OF PRESENT ILLNESS
[Home] : at home, [unfilled] , at the time of the visit. [Medical Office: ([facility])___] : at the medical office located in  [Patient] : the patient [Self] : self [FreeTextEntry2] : TIFFANIE CEE  [FreeTextEntry1] : Mr. Blair is a 73 year old male with a history of asthma, allergy, GERD, abnormal CT, ?OSAS video calls to the office today for a follow up visit . His chief complaint is\par -he notes that His bowels are regular. \par -heartburn happens but it is controlled with medication use. \par -he reports that his weight is stable \par -has not been exercising. \par -has been coughing \par -not sure if he is wheezing. \par -has been having lingering Sore throat for the past 3 weeks. \par -dry interm intent cough happens everyday. coughs couple of times \par -deep breath do not trigger the cough. \par -no fever, takes temperature every other day. \par -has nasal congestion\par -no rhinorrhea \par -Sense of Taste is good. \par -Sense of Smell is good. \par -not using anything for his sinuses. \par -smells a "vinegary smell"\par \par -He denies any chest pain, chest pressure, diarrhea, constipation, dysphagia, dizziness, sour taste in the mouth, leg swelling, leg pain, itchy eyes, itchy ears, heartburn, reflux, myalgias or arthralgias.

## 2020-04-20 NOTE — ADDENDUM
[FreeTextEntry1] : Documented by Mikey Barnett acting as a scribe for Dr. Sanford Muse on 04/20/2020 \par \par All medical record entries made by the Scribe were at my, Dr. Sanford Muse's, direction and personally dictated by me on 04/20/2020 . I have reviewed the chart and agree that the record accurately reflects my personal performance of the history, physical exam, assessment and plan. I have also personally directed, reviewed, and agree with the discharge instructions.

## 2020-04-20 NOTE — PHYSICAL EXAM
[No Acute Distress] : no acute distress [Well Nourished] : well nourished [Normal Appearance] : normal appearance [Well Groomed] : well groomed [Well Developed] : well developed [No Deformities] : no deformities [TextBox_2] : Appears Well

## 2020-04-23 RX ORDER — DESLORATADINE 5 MG/1
5 TABLET ORAL DAILY
Qty: 90 | Refills: 1 | Status: DISCONTINUED | COMMUNITY
Start: 2020-04-20 | End: 2020-04-23

## 2020-05-19 ENCOUNTER — APPOINTMENT (OUTPATIENT)
Dept: PULMONOLOGY | Facility: CLINIC | Age: 75
End: 2020-05-19
Payer: MEDICARE

## 2020-05-19 PROCEDURE — 99442: CPT | Mod: 95

## 2020-05-19 NOTE — REVIEW OF SYSTEMS
[Negative] : Endocrine [Sore Throat] : sore throat [Cough] : cough [Nasal Congestion] : nasal congestion [Seasonal Allergies] : seasonal allergies

## 2020-05-20 NOTE — HISTORY OF PRESENT ILLNESS
[Home] : at home, [unfilled] , at the time of the visit. [Medical Office: ([facility])___] : at the medical office located in  [Patient] : the patient [Self] : self [FreeTextEntry2] : Jah Melo  [FreeTextEntry1] : Mr. Blair is a 73 year old male with a history of asthma, allergy, GERD, abnormal CT, ?OSAS telephone calls to the office today for a follow up visit . His chief complaint is \par - He has an intermittent dry cough about 6 times a day which has been going on for 2 months\par - He has had a sore throat and swollen glands that come and go \par - He has some nasal congestion \par - He is not bringing up anything with the cough \par - He has recently gained 5 lbs \par - No palpitations\par - He has itchy ears \par - He is doing 2 puffs of Symbicort qd which has not made a significant difference\par - He uses Claritin D occasionally \par - denies any headaches, nausea, vomiting, fever, chills, sweats, chest pain, chest pressure, diarrhea, constipation, dysphagia, dizziness, leg swelling, leg pain, itchy eyes, heartburn, reflux, or sour taste in the mouth.\par \par \par

## 2020-05-20 NOTE — ASSESSMENT
[FreeTextEntry1] : Mr. Blair is a 74 year old male who has a history of HTN, cervical radiculopathy, asthma, allergy, GERD, and ?OSAS. He is currently plagued by body pain, intermittent cough/Post Nasal Drip Syndrome (residual cough). \par \par problem 1: Asthma\par - add Singulair 10 mg QHS\par -Continue Generic Advair 232 2 inhalation BID\par -Asthma is believed to be caused by inherited (genetic) and environmental factor, but its exact cause is unknown. Asthma may be triggered by allergens, lung infections, or irritants in the air. Asthma triggers are different for each person\par -Inhaler technique reviewed as well as oral hygiene techniques reviewed with patient. Avoidance of cold air, extremes of temperature, rescue inhaler should be used before exercise. Order of medication reviewed with patient. Recommended use of a cool mist humidifier in the bedroom.\par \par problem 2: abnormal chest CT improved c/w inflammation \par -follow up chest CT in 10/2020\par -confirmed hernia of the chest consistent with prior lung surgery \par \par problem 3: ? ALINE\par -based on his fatigue, EDS, questionable snoring, and elevated Mallampati class\par -he is being recommended to have an at home sleep study\par -recommended Oxy-Aid or Provent \par -Discussed the risks/associations with coronary artery disease, atrial fibrillation, arrhythmia, memory loss, issues with concentration, stroke risk, hypertension, nocturia, chronic reflux/Madera’s esophagus some but not all inclusive. Treatment options discussed including CPAP/BiPAP machine, oral appliance, ProVent therapy, Oxy-Aid by [employer]pitec, new technologies, or positional sleep.\par -Treatment options discussed including CPAP/BiPAP machine, oral appliance, ProVent therapy, Oxy-Aid by [employer]pite[employer], new technologies, or positional sleep.Recommended use of the CPAP machine for moderate (AHI >15), moderate to severe (AHI 15-30) and severe patients (AHI > 30). Recommended weight loss which can reduce AHI especially in weight loss of greater than 5% of BMI. Positional sleep is recommended in those with low AHI, low-moderate BMI, and younger age. For severe sleep apnea, the hypoglossal nerve stimulator was recommended as well.\par \par problem 4: allergic rhinitis \par - add Xyzal 5 mg QHS\par -continue to use Astelin .15 1 sniff each nostril BID\par -continue to use OTC antihistamines / Clarinex 5mg QHS \par -Environmental measures for allergies were encouraged including mattress and pillow cover, air purifier, and environmental controls.\par \par problem 5: overweight\par -Weight loss, exercise, and diet control were discussed and are highly encouraged. Treatment options were given such as, aqua therapy, and contacting a nutritionist. Recommended to use the elliptical, stationary bike, less use of treadmill. Obesity is associated with worsening asthma, shortness of breath, and potential for cardiac disease, diabetes, and other underlying medical conditions.\par \par problem 6: GERD\par -continue to use Pepcid 40 mg QHS\par -continue Carafate 1pill  pre-meal and qhs \par -Rule of 2s: avoid eating too much, eating too late, eating too spicy, eating two hours before bed\par -Things to avoid including overeating, spicy foods, tight clothing, eating within three hours of bed, this list is not all inclusive. \par -For treatment of reflux, possible options discussed including diet control, H2 blockers, PPIs, as well as coating motility agents discussed as treatment options. Timing of meals and proximity of last meal to sleep were discussed. If symptoms persist, a formal gastrointestinal evaluation is needed. \par \par problem 7: chest indentation\par -felt to be related to prior surgery\par -he is being recommended to have cardiac therapy\par \par Problem 8: Health Maintenance/COVID19 Precautions:\par -OTC Vitamin C 500mg BID \par -OTC Quercetin 250-500mg BID \par -OTC Zinc 75-100mg per day \par -OTC Melatonin 1or 2mg a night \par -OTC Vitamin D 1-4000mg per day\par -Tonic Water 8oz\par -Recommended to Stay Hydrated (At least a gallon/day)\par \par problem 9: health maintenance \par -recommended yearly flu shot after October 2018 - refused \par -recommended strep pneumonia vaccines: Prevnar-13 vaccine, followed by Pneumo vaccine 23 one year following\par -recommended early intervention for URIs\par -recommended regular osteoporosis evaluations\par -recommended early dermatological evaluations\par -recommended after the age of 50 to the age of 70, colonoscopy every 5 years \par \par problem 9: poor sleep hygiene ( in place) \par -recommended to wear sunglasses 30 minutes before bed \par -Good sleep hygiene was encouraged including avoiding watching television an hour before bed, keeping caffeine at a low,  avoiding reading, television, or anything, in bed, no drinking any liquids three hours before bedtime, and only getting into bed when tired and ready for sleep. \par \par F/U in 4 months\par He is encouraged to call with any changes, concerns, or questions. \par

## 2020-05-20 NOTE — ADDENDUM
[FreeTextEntry1] : Documented by Leeanne Weaver acting as a scribe for Dr. Sanford Muse on (05/19/2020).\par \par All medical record entries made by the Scribe were at my, Dr. Sanford Muse's, direction and personally dictated by me on (05/19/2020). I have reviewed the chart and agree that the record accurately reflects my personal performance of the history, physical exam, assessment and plan. I have also personally directed, reviewed, and agree with the discharge instructions. \par \par \par

## 2020-05-20 NOTE — REASON FOR VISIT
[Follow-Up] : a follow-up visit [FreeTextEntry1] : telephonic - Asthma, allergy, GERD, abnormal CT, ?OSAS

## 2020-07-15 ENCOUNTER — APPOINTMENT (OUTPATIENT)
Dept: PULMONOLOGY | Facility: CLINIC | Age: 75
End: 2020-07-15
Payer: MEDICARE

## 2020-07-15 DIAGNOSIS — J45.909 UNSPECIFIED ASTHMA, UNCOMPLICATED: ICD-10-CM

## 2020-07-15 PROCEDURE — 99214 OFFICE O/P EST MOD 30 MIN: CPT | Mod: 95

## 2020-07-15 RX ORDER — DOXYCYCLINE HYCLATE 100 MG/1
100 CAPSULE ORAL
Qty: 20 | Refills: 0 | Status: DISCONTINUED | COMMUNITY
Start: 2020-04-20 | End: 2020-07-15

## 2020-07-15 NOTE — ASSESSMENT
[FreeTextEntry1] : Mr. Blair is a 74 year old male who has a history of HTN, cervical radiculopathy, asthma, allergy, GERD, and ?OSAS. He was spoken to via video, and is currently plagued by intermittent cough/Post Nasal Drip Syndrome (residual cough) - still present \par \par problem 1: Asthma\par -continue Singulair 10 mg QHS\par -Hold on Symbicort - Med Holiday. \par -Asthma is believed to be caused by inherited (genetic) and environmental factor, but its exact cause is unknown. Asthma may be triggered by allergens, lung infections, or irritants in the air. Asthma triggers are different for each person\par -Inhaler technique reviewed as well as oral hygiene techniques reviewed with patient. Avoidance of cold air, extremes of temperature, rescue inhaler should be used before exercise. Order of medication reviewed with patient. Recommended use of a cool mist humidifier in the bedroom.\par \par Problem 1A: Thoat Sx\par -Throat Coat Tea \par \par problem 2: abnormal chest CT improved c/w inflammation \par -follow up chest CT in 10/2020\par -confirmed hernia of the chest consistent with prior lung surgery \par \par problem 3: ? ALINE\par -based on his fatigue, EDS, questionable snoring, and elevated Mallampati class\par -he is being recommended to have an at home sleep study\par -recommended Oxy-Aid or Provent \par -Discussed the risks/associations with coronary artery disease, atrial fibrillation, arrhythmia, memory loss, issues with concentration, stroke risk, hypertension, nocturia, chronic reflux/[place]’s esophagus some but not all inclusive. Treatment options discussed including CPAP/BiPAP machine, oral appliance, ProVent therapy, Oxy-Aid by [employer]pite[employer], new technologies, or positional sleep.\par -Treatment options discussed including CPAP/BiPAP machine, oral appliance, ProVent therapy, Oxy-Aid by [employer], new technologies, or positional sleep.Recommended use of the CPAP machine for moderate (AHI >15), moderate to severe (AHI 15-30) and severe patients (AHI > 30). Recommended weight loss which can reduce AHI especially in weight loss of greater than 5% of BMI. Positional sleep is recommended in those with low AHI, low-moderate BMI, and younger age. For severe sleep apnea, the hypoglossal nerve stimulator was recommended as well.\par \par problem 4: allergic rhinitis \par - add Xyzal 5 mg QHS\par -continue to use Astelin .15 1 sniff each nostril BID\par -continue to use OTC antihistamines / Clarinex 5mg QHS \par -Environmental measures for allergies were encouraged including mattress and pillow cover, air purifier, and environmental controls.\par \par problem 5: overweight\par -Weight loss, exercise, and diet control were discussed and are highly encouraged. Treatment options were given such as, aqua therapy, and contacting a nutritionist. Recommended to use the elliptical, stationary bike, less use of treadmill. Obesity is associated with worsening asthma, shortness of breath, and potential for cardiac disease, diabetes, and other underlying medical conditions.\par \par problem 6: GERD\par -continue to use Pepcid 40 mg QHS\par -continue Carafate 1pill  pre-meal and qhs \par -Rule of 2s: avoid eating too much, eating too late, eating too spicy, eating two hours before bed\par -Things to avoid including overeating, spicy foods, tight clothing, eating within three hours of bed, this list is not all inclusive. \par -For treatment of reflux, possible options discussed including diet control, H2 blockers, PPIs, as well as coating motility agents discussed as treatment options. Timing of meals and proximity of last meal to sleep were discussed. If symptoms persist, a formal gastrointestinal evaluation is needed. \par \par problem 7: chest indentation\par -felt to be related to prior surgery\par -he is being recommended to have cardiac therapy\par \par Problem 8: Health Maintenance/COVID19 Precautions:\par -OTC Vitamin C 500mg BID \par -OTC Quercetin 250-500mg BID \par -OTC Zinc 75-100mg per day \par -OTC Melatonin 1or 2mg a night \par -OTC Vitamin D 1-4000mg per day\par -Tonic Water 8oz\par -Recommended to Stay Hydrated (At least a gallon/day)\par \par problem 9: health maintenance \par -recommended yearly flu shot after October 2018 - refused \par -recommended strep pneumonia vaccines: Prevnar-13 vaccine, followed by Pneumo vaccine 23 one year following\par -recommended early intervention for URIs\par -recommended regular osteoporosis evaluations\par -recommended early dermatological evaluations\par -recommended after the age of 50 to the age of 70, colonoscopy every 5 years \par \par problem 9: poor sleep hygiene ( in place) \par -recommended to wear sunglasses 30 minutes before bed \par -Good sleep hygiene was encouraged including avoiding watching television an hour before bed, keeping caffeine at a low,  avoiding reading, television, or anything, in bed, no drinking any liquids three hours before bedtime, and only getting into bed when tired and ready for sleep. \par \par F/U in 4 months\par He is encouraged to call with any changes, concerns, or questions. \par

## 2020-07-15 NOTE — HISTORY OF PRESENT ILLNESS
[Home] : at home, [unfilled] , at the time of the visit. [Medical Office: ([facility])___] : at the medical office located in  [Verbal consent obtained from patient] : the patient, [unfilled] [FreeTextEntry1] : Mr. Blair is a 74 year old male with a history of asthma, allergy, GERD, abnormal CT, ?OSAS video calls to the office today for a follow up visit . His chief complaint is chronic cough. \par -he states that he is not feeling any better than his last visit. \par -reports chest pressure. \par -he states that he has this "froggy" sound in his voice. \par -denies any fever. \par -feels achy but has been trying to exercise.\par -Sense of Taste is good. \par -Sense of Smell is good. \par -he is concerned with the medications he is on. \par -if he goes up the stairs too quickly, he becomes tired and feels slightly SOB. \par -he states that he is not wheezing. \par -reports coughing half a dozen times 1-2 times a day. \par -unsure where the cough is coming from. \par -gets a tickle in his throat but does not think it is a sore throat. \par -nothing exciting happening in his throat. \par -he reports that he is not brining up any mucus with his cough. \par -denies any fevers, chills, or sweats. \par -reports some hoarseness. \par -he states that the cough is not prominent. \par \par -he denies any diarrhea, constipation, dysphagia, dizziness, sour taste in the mouth, leg swelling, leg pain, itchy eyes, itchy ears, heartburn, reflux, myalgias or arthralgias.

## 2020-07-15 NOTE — END OF VISIT
[FreeTextEntry3] : I have spent 15 minutes of time on the phone with the patient with their consent.  [Time Spent: ___ minutes] : I have spent [unfilled] minutes of time on the encounter.

## 2020-07-15 NOTE — REVIEW OF SYSTEMS
[Nasal Congestion] : nasal congestion [Sore Throat] : sore throat [Cough] : cough [Seasonal Allergies] : seasonal allergies [Negative] : Endocrine [Fatigue] : fatigue [Chest Discomfort] : chest discomfort [Fever] : no fever [Chills] : no chills [Palpitations] : no palpitations

## 2020-07-15 NOTE — ADDENDUM
[FreeTextEntry1] : Documented by Mikey Barnett acting as a scribe for Dr. Sanford Muse on 07/15/2020 \par \par All medical record entries made by the Scribe were at my, Dr. Sanford Muse's, direction and personally dictated by me on 07/15/2020 . I have reviewed the chart and agree that the record accurately reflects my personal performance of the history, physical exam, assessment and plan. I have also personally directed, reviewed, and agree with the discharge instructions.

## 2020-08-03 ENCOUNTER — APPOINTMENT (OUTPATIENT)
Dept: PULMONOLOGY | Facility: CLINIC | Age: 75
End: 2020-08-03
Payer: MEDICARE

## 2020-08-03 VITALS
HEIGHT: 70 IN | SYSTOLIC BLOOD PRESSURE: 110 MMHG | OXYGEN SATURATION: 98 % | HEART RATE: 64 BPM | WEIGHT: 211 LBS | BODY MASS INDEX: 30.21 KG/M2 | RESPIRATION RATE: 17 BRPM | DIASTOLIC BLOOD PRESSURE: 70 MMHG | TEMPERATURE: 97.1 F

## 2020-08-03 PROCEDURE — 99214 OFFICE O/P EST MOD 30 MIN: CPT | Mod: 25

## 2020-08-03 PROCEDURE — 94618 PULMONARY STRESS TESTING: CPT

## 2020-08-03 RX ORDER — MOMETASONE 50 UG/1
50 SPRAY, METERED NASAL TWICE DAILY
Qty: 3 | Refills: 1 | Status: ACTIVE | COMMUNITY
Start: 2020-08-03 | End: 1900-01-01

## 2020-08-03 NOTE — ADDENDUM
[FreeTextEntry1] : Documented by Ameya Dangelo acting as a scribe for Dr. Sanford Muse on 08/03/2020.\par \par All medical record entries made by the Scribe were at my, Dr. Sanford Muse's, direction and personally dictated by me on 08/03/2020. I have reviewed the chart and agree that the record accurately reflects my personal performance of the history, physical exam, assessment and plan. I have also personally directed, reviewed, and agree with the discharge instructions.

## 2020-08-03 NOTE — HISTORY OF PRESENT ILLNESS
[FreeTextEntry1] : Mr. Blair is a 74 year old male with a history of asthma, allergy, GERD, abnormal CT, ?OSAS video calls to the office today for a follow up visit . His chief complaint is\par \par -he notes intermittent retrosternal chest discomfort unsure over onset, a couple weeks at least\par -he notes chest pressure\par -he denies wheeze\par -he notes heartburn is controlled with Pepcid use\par -he denies palpitations\par -he notes visual issues slowing progressing\par -he notes weight slightly up\par -he notes \par -he notes sporadic sore throat\par -he notes sporadic cough episodes onset 4 months ago\par -he notes cough is not productive and aided by Symbicort use\par -he denies fever\par -he denies nasal congestion, with Xyzal aiding breathing\par -he notes senses of smell and taste are good\par -he notes vinegar smell in nostrils\par -he notes sleep is improved when sitting upright sleeping in chair\par -he notes falls out of bed during deep sleep, reoccurring for 3 episodes within past year Jan 2019, Jan 2020, July 2020\par -he notes hydrating well\par -he notes poor balance\par -he notes general anxiety over COVID 19 pandemic \par \par -he denies any chest pain, diarrhea, constipation, dysphagia, dizziness, sour taste in the mouth, leg swelling, leg pain, itchy eyes, itchy ears, heartburn, reflux, myalgias or arthralgias.

## 2020-08-03 NOTE — ASSESSMENT
[FreeTextEntry1] : Mr. Blair is a 74 year old male who has a history of HTN, cervical radiculopathy, asthma, allergy, GERD, and ?OSAS. He presents to the office for a pulmonary follow up. He is currently plagued by intermittent cough/Post Nasal Drip Syndrome (residual cough) - still present, but better on Symbicort\par \par problem 1: Asthma\par -continue Singulair 10 mg QHS\par -restart Symbicort 160 2 inhalations BID \par -Asthma is believed to be caused by inherited (genetic) and environmental factor, but its exact cause is unknown. Asthma may be triggered by allergens, lung infections, or irritants in the air. Asthma triggers are different for each person\par -Inhaler technique reviewed as well as oral hygiene techniques reviewed with patient. Avoidance of cold air, extremes of temperature, rescue inhaler should be used before exercise. Order of medication reviewed with patient. Recommended use of a cool mist humidifier in the bedroom.\par \par Problem 1A: Thoat Sx\par -Throat Coat Tea \par -on Biotin Supplementation\par \par problem 2: abnormal chest CT improved c/w inflammation \par -follow up chest CT in 10/2020\par -confirmed hernia of the chest consistent with prior lung surgery \par \par problem 3: ? ALINE/ ?REM Sleep Dz\par -based on his fatigue, EDS, questionable snoring, and elevated Mallampati class\par -he is being recommended to have an at home sleep study\par -refused in lab Sleep study\par -recommended Oxy-Aid or Provent \par -Discussed the risks/associations with coronary artery disease, atrial fibrillation, arrhythmia, memory loss, issues with concentration, stroke risk, hypertension, nocturia, chronic reflux/[place]’s esophagus some but not all inclusive. Treatment options discussed including CPAP/BiPAP machine, oral appliance, ProVent therapy, Oxy-Aid by [employer], new technologies, or positional sleep.\par -Treatment options discussed including CPAP/BiPAP machine, oral appliance, ProVent therapy, Oxy-Aid by [employer], new technologies, or positional sleep.Recommended use of the CPAP machine for moderate (AHI >15), moderate to severe (AHI 15-30) and severe patients (AHI > 30). Recommended weight loss which can reduce AHI especially in weight loss of greater than 5% of BMI. Positional sleep is recommended in those with low AHI, low-moderate BMI, and younger age. For severe sleep apnea, the hypoglossal nerve stimulator was recommended as well.\par \par problem 4: allergic rhinitis \par - continue Xyzal 5 mg QHS\par -continue to use Astelin .15 1 sniff each nostril BID\par -add trial of Nasonex 1 sniff BID\par -continue to use OTC antihistamines / Clarinex 5mg QHS prn\par -Environmental measures for allergies were encouraged including mattress and pillow cover, air purifier, and environmental controls.\par \par problem 5: overweight\par -Weight loss, exercise, and diet control were discussed and are highly encouraged. Treatment options were given such as, aqua therapy, and contacting a nutritionist. Recommended to use the elliptical, stationary bike, less use of treadmill. Obesity is associated with worsening asthma, shortness of breath, and potential for cardiac disease, diabetes, and other underlying medical conditions.\par \par problem 5A: Poor Balance\par -recommended prescription for gait training and balance therapy (refused currently due to COVID anxiety)\par \par problem 6: GERD\par -continue to use Pepcid 40 mg QHS\par -Rule of 2s: avoid eating too much, eating too late, eating too spicy, eating two hours before bed\par -Things to avoid including overeating, spicy foods, tight clothing, eating within three hours of bed, this list is not all inclusive. \par -For treatment of reflux, possible options discussed including diet control, H2 blockers, PPIs, as well as coating motility agents discussed as treatment options. Timing of meals and proximity of last meal to sleep were discussed. If symptoms persist, a formal gastrointestinal evaluation is needed. \par \par problem 7: chest indentation\par -felt to be related to prior surgery\par -he is being recommended to have cardiac therapy\par \par Problem 8: Health Maintenance/COVID19 Precautions:\par -OTC Vitamin C 500mg BID \par -OTC Quercetin 250-500mg BID \par -OTC Zinc 75-100mg per day \par -OTC Melatonin 1or 2mg a night \par -OTC Vitamin D 1-4000mg per day\par -Tonic Water 8oz\par -Recommended to Stay Hydrated (At least a gallon/day)\par \par problem 9: health maintenance \par -recommended yearly flu shot after October 2018 - refused \par -recommended strep pneumonia vaccines: Prevnar-13 vaccine, followed by Pneumo vaccine 23 one year following\par -recommended early intervention for URIs\par -recommended regular osteoporosis evaluations\par -recommended early dermatological evaluations\par -recommended after the age of 50 to the age of 70, colonoscopy every 5 years \par \par problem 10: poor sleep hygiene ( in place) \par -recommended to wear sunglasses 30 minutes before bed \par -Good sleep hygiene was encouraged including avoiding watching television an hour before bed, keeping caffeine at a low,  avoiding reading, television, or anything, in bed, no drinking any liquids three hours before bedtime, and only getting into bed when tired and ready for sleep. \par \par F/U in 4 months\par He is encouraged to call with any changes, concerns, or questions. \par

## 2020-08-03 NOTE — PROCEDURE
[FreeTextEntry1] : 6 minute walk test reveals a low saturation of 97% with slight evidence of dyspnea or fatigue; walked  489 meters

## 2020-08-03 NOTE — PHYSICAL EXAM
[No Acute Distress] : no acute distress [No Neck Mass] : no neck mass [Normal Appearance] : normal appearance [Normal Oropharynx] : normal oropharynx [Normal Rate/Rhythm] : normal rate/rhythm [Normal S1, S2] : normal s1, s2 [No Murmurs] : no murmurs [Clear to Auscultation Bilaterally] : clear to auscultation bilaterally [Benign] : benign [No Abnormalities] : no abnormalities [No Resp Distress] : no resp distress [Normal Gait] : normal gait [No Cyanosis] : no cyanosis [No Clubbing] : no clubbing [Normal Color/ Pigmentation] : normal color/ pigmentation [No Edema] : no edema [FROM] : FROM [Oriented x3] : oriented x3 [Normal Affect] : normal affect [No Focal Deficits] : no focal deficits [III] : Mallampati Class: III [TextBox_68] : I:E ratio 1:3; clear

## 2020-08-04 RX ORDER — FLUNISOLIDE 0.25 MG/ML
25 MCG/ACT SOLUTION NASAL TWICE DAILY
Qty: 3 | Refills: 1 | Status: ACTIVE | COMMUNITY
Start: 2020-08-04 | End: 1900-01-01

## 2020-10-21 ENCOUNTER — APPOINTMENT (OUTPATIENT)
Dept: PULMONOLOGY | Facility: CLINIC | Age: 75
End: 2020-10-21
Payer: MEDICARE

## 2020-10-21 VITALS
HEART RATE: 54 BPM | WEIGHT: 214 LBS | OXYGEN SATURATION: 98 % | HEIGHT: 70 IN | RESPIRATION RATE: 17 BRPM | SYSTOLIC BLOOD PRESSURE: 140 MMHG | DIASTOLIC BLOOD PRESSURE: 68 MMHG | TEMPERATURE: 97.4 F | BODY MASS INDEX: 30.64 KG/M2

## 2020-10-21 PROCEDURE — G0008: CPT

## 2020-10-21 PROCEDURE — 99214 OFFICE O/P EST MOD 30 MIN: CPT | Mod: 25

## 2020-10-21 PROCEDURE — 90682 RIV4 VACC RECOMBINANT DNA IM: CPT

## 2020-10-21 NOTE — PHYSICAL EXAM
[No Acute Distress] : no acute distress [Normal Oropharynx] : normal oropharynx [III] : Mallampati Class: III [Normal Appearance] : normal appearance [No Neck Mass] : no neck mass [Normal Rate/Rhythm] : normal rate/rhythm [Normal S1, S2] : normal s1, s2 [No Murmurs] : no murmurs [No Resp Distress] : no resp distress [Clear to Auscultation Bilaterally] : clear to auscultation bilaterally [No Abnormalities] : no abnormalities [Benign] : benign [Normal Gait] : normal gait [No Clubbing] : no clubbing [No Cyanosis] : no cyanosis [No Edema] : no edema [FROM] : FROM [Normal Color/ Pigmentation] : normal color/ pigmentation [No Focal Deficits] : no focal deficits [Oriented x3] : oriented x3 [Normal Affect] : normal affect [TextBox_68] : I:E ratio 1:3; clear

## 2020-10-21 NOTE — HISTORY OF PRESENT ILLNESS
[FreeTextEntry1] : Mr. Blair is a 74 year old male with a history of asthma, allergy, GERD, abnormal CT, ?OSAS video calls to the office today for a follow up visit . His chief complaint is\par - he notes he has not been sleeping well. He is not getting enough sleep. \par - he's not sure if he's snoring\par - bowel movements are regular \par - he notes he has been experiencing SOB whenever he exerts himself, with any exertion. \par - he notes after sex he was feeling palpitations and he was worried about that. \par - he is wondering if he should have an ECHO and stress test before he goes to [place]. \par - he notes he has never had a pneumonia shot, he will be having his first flu shot today. \par - he notes the last time he was here he had a dry cough but now its just an occasional cough and he feels froggy/hoarseness in the throat. \par - he senses a vinegary smell. \par - he has been using his inhaler, Symbicort, religiously. \par - he notes he had mild peripheral myopathy \par - he notes sometimes he feels queazy / clumsy and wobbly. \par - He  denies any visual issues, headaches, nausea, vomiting, fever, chills, sweats, chest pains, chest pressure, diarrhea, constipation, dysphagia, myalgia, dizziness, leg swelling, leg pain, itchy eyes, itchy ears, heartburn, reflux, or sour taste in the mouth.

## 2020-10-21 NOTE — ADDENDUM
[FreeTextEntry1] : Documented by Pearl Guillen acting as a scribe for Dr. Sanford Muse on 10/21/2020 \par \par All medical record entries made by the Scribe were at my, Dr. Sanford Muse's, direction and personally dictated by me on 10/21/2020 . I have reviewed the chart and agree that the record accurately reflects my personal performance of the history, physical exam, assessment and plan. I have also personally directed, reviewed, and agree with the discharge instructions.

## 2020-10-21 NOTE — ASSESSMENT
[FreeTextEntry1] : Mr. Blair is a 74 year old male who has a history of HTN, cervical radiculopathy, asthma, allergy, GERD, and ?OSAS. He presents to the office for a pulmonary follow up. He is currently plagued by mild SOB (KOENIG)\par \par His shortness of breath is multifactorial due to:\par -poor mechanics of breathing \par -out of shape / overweight\par -pulmonary disease\par    - Asthma\par    - Abnormal CT \par -cardiac disease -  \par \par \par problem 1: Asthma\par -continue Singulair 10 mg QHS\par -continue Symbicort 160 2 inhalations BID \par -Asthma is believed to be caused by inherited (genetic) and environmental factor, but its exact cause is unknown. Asthma may be triggered by allergens, lung infections, or irritants in the air. Asthma triggers are different for each person\par -Inhaler technique reviewed as well as oral hygiene techniques reviewed with patient. Avoidance of cold air, extremes of temperature, rescue inhaler should be used before exercise. Order of medication reviewed with patient. Recommended use of a cool mist humidifier in the bedroom.\par \par Problem 1A: Thoat Sx\par -Throat Coat Tea \par -on Biotin Supplementation\par \par problem 2: abnormal chest CT improved c/w inflammation \par -follow up chest CT in 10/2020\par -confirmed hernia of the chest consistent with prior lung surgery \par \par problem 3: ? ALINE/ ?REM Sleep Dz\par -based on his fatigue, EDS, questionable snoring, and elevated Mallampati class\par -he is being recommended to have an at home sleep study\par -refused in lab Sleep study\par -recommended Oxy-Aid or Provent \par -Discussed the risks/associations with coronary artery disease, atrial fibrillation, arrhythmia, memory loss, issues with concentration, stroke risk, hypertension, nocturia, chronic reflux/[place]’s esophagus some but not all inclusive. Treatment options discussed including CPAP/BiPAP machine, oral appliance, ProVent therapy, Oxy-Aid by Respitec, new technologies, or positional sleep.\par -Treatment options discussed including CPAP/BiPAP machine, oral appliance, ProVent therapy, Oxy-Aid by Respitec, new technologies, or positional sleep.Recommended use of the CPAP machine for moderate (AHI >15), moderate to severe (AHI 15-30) and severe patients (AHI > 30). Recommended weight loss which can reduce AHI especially in weight loss of greater than 5% of BMI. Positional sleep is recommended in those with low AHI, low-moderate BMI, and younger age. For severe sleep apnea, the hypoglossal nerve stimulator was recommended as well.\par \par problem 4: allergic rhinitis \par - continue Xyzal 5 mg QHS\par -continue to use Astelin .15 1 sniff each nostril BID\par - Add Nasacort  AM 1 BID \par -continue to use OTC antihistamines / Clarinex 5mg QHS prn\par -Environmental measures for allergies were encouraged including mattress and pillow cover, air purifier, and environmental controls.\par \par problem 5: overweight\par -Weight loss, exercise, and diet control were discussed and are highly encouraged. Treatment options were given such as, aqua therapy, and contacting a nutritionist. Recommended to use the elliptical, stationary bike, less use of treadmill. Obesity is associated with worsening asthma, shortness of breath, and potential for cardiac disease, diabetes, and other underlying medical conditions.\par \par problem 5A: Poor Balance\par -recommended prescription for gait training and balance therapy (refused currently due to COVID anxiety)\par \par problem 6: GERD\par -continue to use Pepcid 40 mg QHS\par -Rule of 2s: avoid eating too much, eating too late, eating too spicy, eating two hours before bed\par -Things to avoid including overeating, spicy foods, tight clothing, eating within three hours of bed, this list is not all inclusive. \par -For treatment of reflux, possible options discussed including diet control, H2 blockers, PPIs, as well as coating motility agents discussed as treatment options. Timing of meals and proximity of last meal to sleep were discussed. If symptoms persist, a formal gastrointestinal evaluation is needed. \par \par problem 7: chest indentation\par -felt to be related to prior surgery\par -he is being recommended to have cardiac therapy\par \par problem 8 : poor breathing mechanics\par -Proper breathing techniques were reviewed with an emphasis of exhalation. Patient instructed to breath in for 1 second and out for four seconds. Patient was encouraged to not talk while walking. \par \par Problem 9: Health Maintenance/COVID19 Precautions:\par -OTC Vitamin C 500mg BID \par -OTC Quercetin 250-500mg BID \par -OTC Zinc 75-100mg per day \par -OTC Melatonin 1or 2mg a night \par -OTC Vitamin D 1-4000mg per day\par -Tonic Water 8oz\par -Recommended to Stay Hydrated (At least a gallon/day)\par \par problem 10: health maintenance \par -recommended yearly flu -10/21/2020\par -recommended strep pneumonia vaccines: Prevnar-13 vaccine, followed by Pneumo vaccine 23 one year following\par -recommended early intervention for URIs\par -recommended regular osteoporosis evaluations\par -recommended early dermatological evaluations\par -recommended after the age of 50 to the age of 70, colonoscopy every 5 years \par \par problem 11: poor sleep hygiene ( in place) \par -recommended to wear sunglasses 30 minutes before bed \par -Good sleep hygiene was encouraged including avoiding watching television an hour before bed, keeping caffeine at a low,  avoiding reading, television, or anything, in bed, no drinking any liquids three hours before bedtime, and only getting into bed when tired and ready for sleep. \par \par F/U in 4 months\par He is encouraged to call with any changes, concerns, or questions. \par

## 2020-10-31 ENCOUNTER — APPOINTMENT (OUTPATIENT)
Dept: CT IMAGING | Facility: CLINIC | Age: 75
End: 2020-10-31
Payer: MEDICARE

## 2020-10-31 ENCOUNTER — RESULT REVIEW (OUTPATIENT)
Age: 75
End: 2020-10-31

## 2020-10-31 ENCOUNTER — OUTPATIENT (OUTPATIENT)
Dept: OUTPATIENT SERVICES | Facility: HOSPITAL | Age: 75
LOS: 1 days | End: 2020-10-31
Payer: MEDICARE

## 2020-10-31 DIAGNOSIS — Z00.8 ENCOUNTER FOR OTHER GENERAL EXAMINATION: ICD-10-CM

## 2020-10-31 PROCEDURE — 71250 CT THORAX DX C-: CPT | Mod: 26

## 2020-10-31 PROCEDURE — 71250 CT THORAX DX C-: CPT

## 2020-11-04 ENCOUNTER — NON-APPOINTMENT (OUTPATIENT)
Age: 75
End: 2020-11-04

## 2020-11-24 ENCOUNTER — RX RENEWAL (OUTPATIENT)
Age: 75
End: 2020-11-24

## 2021-01-22 ENCOUNTER — RX RENEWAL (OUTPATIENT)
Age: 76
End: 2021-01-22

## 2021-03-10 ENCOUNTER — NON-APPOINTMENT (OUTPATIENT)
Age: 76
End: 2021-03-10

## 2021-05-14 ENCOUNTER — APPOINTMENT (OUTPATIENT)
Dept: PULMONOLOGY | Facility: CLINIC | Age: 76
End: 2021-05-14

## 2021-05-17 ENCOUNTER — NON-APPOINTMENT (OUTPATIENT)
Age: 76
End: 2021-05-17

## 2021-05-17 ENCOUNTER — APPOINTMENT (OUTPATIENT)
Dept: PULMONOLOGY | Facility: CLINIC | Age: 76
End: 2021-05-17
Payer: MEDICARE

## 2021-05-17 VITALS
WEIGHT: 220 LBS | BODY MASS INDEX: 31.5 KG/M2 | HEIGHT: 70 IN | SYSTOLIC BLOOD PRESSURE: 120 MMHG | OXYGEN SATURATION: 98 % | HEART RATE: 69 BPM | DIASTOLIC BLOOD PRESSURE: 65 MMHG | TEMPERATURE: 95.9 F | RESPIRATION RATE: 16 BRPM

## 2021-05-17 PROCEDURE — 99214 OFFICE O/P EST MOD 30 MIN: CPT | Mod: 25

## 2021-05-17 PROCEDURE — 95012 NITRIC OXIDE EXP GAS DETER: CPT

## 2021-05-17 PROCEDURE — 94010 BREATHING CAPACITY TEST: CPT

## 2021-05-17 NOTE — PHYSICAL EXAM
[No Acute Distress] : no acute distress [Normal Oropharynx] : normal oropharynx [III] : Mallampati Class: III [Normal Appearance] : normal appearance [No Neck Mass] : no neck mass [Normal Rate/Rhythm] : normal rate/rhythm [Normal S1, S2] : normal s1, s2 [No Murmurs] : no murmurs [No Resp Distress] : no resp distress [Clear to Auscultation Bilaterally] : clear to auscultation bilaterally [No Abnormalities] : no abnormalities [Benign] : benign [Normal Gait] : normal gait [No Clubbing] : no clubbing [No Cyanosis] : no cyanosis [FROM] : FROM [1+ Pitting] : 1+ pitting [Normal Color/ Pigmentation] : normal color/ pigmentation [No Focal Deficits] : no focal deficits [Oriented x3] : oriented x3 [Normal Affect] : normal affect [TextBox_68] : I:E ratio 1:3; clear  [TextBox_105] : 1+ Lower Extremity Edema

## 2021-05-17 NOTE — HISTORY OF PRESENT ILLNESS
[FreeTextEntry1] : Mr. Blair is a 75 year old male with a history of asthma, allergy, GERD, abnormal CT, ?OSAS video calls to the office today for a follow up visit . His chief complaint is\par \par -he notes recent return from [place] after 6 months\par -he notes regular evaluation with Dr. Nobles in [place]\par -he notes leg swelling onset 1 month ago, s/p Podiatrist evaluation suspected symmetrical swelling and did not suspect DVT\par -he notes still on Amlodipine, Dr. Puente follow up tomorrow\par -he notes decreased salt in diet after initial swelling\par -he notes weight stable \par -he notes regular exercise, tolerating well\par -he notes difficulties loosing weight\par -he notes intermittent dysphagia residual to heartburn with residual cough to clear\par -he notes intermittent dysphonia \par -he notes internist Dr. Coley\par -he notes nephrology follow up later today\par -he denies SOB\par -he denies orthopnea\par -he denies SOB middle of night\par -he notes intermittent weakness exacerbated by neuropathy with difficulties getting out of chairs onset 1-1.5 years ago\par -he denies leaky, drippy, congested sinuses, controlled with Allergy Rx\par -he notes increased abdominal girth\par \par -denies any chest pain, chest pressure, diarrhea, constipation, sour taste in the mouth, dizziness, leg swelling, leg pain, myalgias, arthralgias, itchy eyes, itchy ears, or reflux.\par \par

## 2021-05-17 NOTE — ADDENDUM
[FreeTextEntry1] : Documented by Ameya Dangelo acting as a scribe for Dr. Sanford Muse on 05/17/2021.\par \par All medical record entries made by the Scribe were at my, Dr. Sanford Muse's, direction and personally dictated by me on 05/17/2021 . I have reviewed the chart and agree that the record accurately reflects my personal performance of the history, physical exam, assessment and plan. I have also personally directed, reviewed, and agree with the discharge instructions. \par

## 2021-05-17 NOTE — ASSESSMENT
[FreeTextEntry1] : Mr. Blair is a 75 year old male who has a history of HTN, cervical radiculopathy, asthma, allergy, GERD, and ?OSAS. He presents to the office for a pulmonary follow up. He is currently plagued by increased abdominal girth and leg weakness. \par \par His shortness of breath is multifactorial due to:\par -poor mechanics of breathing \par -out of shape / overweight\par -pulmonary disease\par    - Asthma\par    - Abnormal CT \par -cardiac disease -  \par \par \par problem 1: Asthma (stable) - Dr. Nobles ([place]) \par -continue Singulair 10 mg QHS\par -continue Symbicort 160 2 inhalations BID \par -Asthma is believed to be caused by inherited (genetic) and environmental factor, but its exact cause is unknown. Asthma may be triggered by allergens, lung infections, or irritants in the air. Asthma triggers are different for each person\par -Inhaler technique reviewed as well as oral hygiene techniques reviewed with patient. Avoidance of cold air, extremes of temperature, rescue inhaler should be used before exercise. Order of medication reviewed with patient. Recommended use of a cool mist humidifier in the bedroom.\par \par Problem 1A: Throat Sx (quiet) \par -Throat Coat Tea \par -on Biotin Supplementation\par \par problem 2: abnormal chest CT improved c/w inflammation \par -follow up chest CT (last 10/2020, next 10/2021) \par -confirmed hernia of the chest consistent with prior lung surgery \par CAT scans are the only radiological modality to identify abnormalities w/in the lungs with regards to nodules/masses/lymph nodes. Risks, benefits were reviewed in detail. The guidelines for abnormalities include follow up CT scans at various intervals which could range from 6 weeks to 1 year intervals. If there is a change for the worse then consideration for a biopsy will be considered if you are a candidate. Second opinion evaluation with a thoracic surgeon or an interventional radiologist could be offered. \par \par problem 3: ? ALINE/ ?REM Sleep Dz\par -based on his fatigue, EDS, questionable snoring, and elevated Mallampati class\par -he is being recommended to have an at home sleep study\par -refused in lab Sleep study\par -recommended Oxy-Aid or Provent \par -Discussed the risks/associations with coronary artery disease, atrial fibrillation, arrhythmia, memory loss, issues with concentration, stroke risk, hypertension, nocturia, chronic reflux/[place]’s esophagus some but not all inclusive. Treatment options discussed including CPAP/BiPAP machine, oral appliance, ProVent therapy, Oxy-Aid by [employer]pitec, new technologies, or positional sleep.\par -Treatment options discussed including CPAP/BiPAP machine, oral appliance, ProVent therapy, Oxy-Aid by [employer]pite[employer], new technologies, or positional sleep.Recommended use of the CPAP machine for moderate (AHI >15), moderate to severe (AHI 15-30) and severe patients (AHI > 30). Recommended weight loss which can reduce AHI especially in weight loss of greater than 5% of BMI. Positional sleep is recommended in those with low AHI, low-moderate BMI, and younger age. For severe sleep apnea, the hypoglossal nerve stimulator was recommended as well.\par \par problem 4: allergic rhinitis (quiet)\par - continue Xyzal 5 mg QHS\par -continue to use Astelin .15 1 sniff each nostril BID\par - continue Nasacort  AM 1 BID  or Flonase 1 sniff/nostril BID\par -continue to use OTC antihistamines / Clarinex 5mg QHS prn\par -Environmental measures for allergies were encouraged including mattress and pillow cover, air purifier, and environmental controls.\par \par problem 5: overweight\par -Weight loss, exercise, and diet control were discussed and are highly encouraged. Treatment options were given such as, aqua therapy, and contacting a nutritionist. Recommended to use the elliptical, stationary bike, less use of treadmill. Obesity is associated with worsening asthma, shortness of breath, and potential for cardiac disease, diabetes, and other underlying medical conditions.\par \par problem 5A: Poor Balance\par -recommended prescription for gait training and balance therapy (refused currently due to COVID anxiety)\par \par problem 6: GERD\par -add Protonix 40 mg QAM, pre-breakfast\par -continue to use Pepcid 40 mg QHS\par -Rule of 2s: avoid eating too much, eating too late, eating too spicy, eating two hours before bed\par -Things to avoid including overeating, spicy foods, tight clothing, eating within three hours of bed, this list is not all inclusive. \par -For treatment of reflux, possible options discussed including diet control, H2 blockers, PPIs, as well as coating motility agents discussed as treatment options. Timing of meals and proximity of last meal to sleep were discussed. If symptoms persist, a formal gastrointestinal evaluation is needed. \par \par problem 7: chest indentation\par -felt to be related to prior surgery\par -he is being recommended to have cardiac therapy\par \par problem 8 : poor breathing mechanics\par -Proper breathing techniques were reviewed with an emphasis of exhalation. Patient instructed to breath in for 1 second and out for four seconds. Patient was encouraged to not talk while walking. \par \par Problem 9: Health Maintenance/COVID19 Precautions:- ?Metabolic Syndrome\par -s/p Moderna COVID 19 vaccine x 2 \par -complete renal evaluation\par -complete Endocrine evaluation\par \par -OTC Vitamin C 500mg BID \par -OTC Quercetin 250-500mg BID \par -OTC Zinc 75-100mg per day \par -OTC Melatonin 1or 2mg a night \par -OTC Vitamin D 1-4000mg per day\par -Tonic Water 8oz\par -Recommended to Stay Hydrated (At least a gallon/day)\par \par problem 10: health maintenance \par -recommended Neuro Renew\par -recommended yearly flu -10/21/2020\par -recommended strep pneumonia vaccines: Prevnar-13 vaccine, followed by Pneumo vaccine 23 one year following\par -recommended early intervention for URIs\par -recommended regular osteoporosis evaluations\par -recommended early dermatological evaluations\par -recommended after the age of 50 to the age of 70, colonoscopy every 5 years \par \par problem 11: poor sleep hygiene ( in place) \par -recommended to wear sunglasses 30 minutes before bed \par -Good sleep hygiene was encouraged including avoiding watching television an hour before bed, keeping caffeine at a low,  avoiding reading, television, or anything, in bed, no drinking any liquids three hours before bedtime, and only getting into bed when tired and ready for sleep. \par \par F/U in 4 months\par He is encouraged to call with any changes, concerns, or questions. \par

## 2021-05-17 NOTE — PROCEDURE
[FreeTextEntry1] : CT Chest (10.31.2020) reveals 4 mm left upper lobe pulmonary nodule, unchanged since 5/7/2016.\par \par PFT revealed normal flows, with a FEV1 of 2.88 L, which is 88 % of predicted, normal lung volumes, and with a normal flow volume loop.\par \par FENO was 45; a normal value being less than 25\par Fractional exhaled nitric oxide (FENO) is regarded as a simple, noninvasive method for assessing eosinophilic airway inflammation. Produced by a variety of cells within the lung, nitric oxide (NO) concentrations are generally low in healthy individuals. However, high concentrations of NO appear to be involved in nonspecific host defense mechanisms and chronic inflammatory diseases such as asthma. The American Thoracic Society (ATS) therefore has recommended using FENO to aid in the diagnosis and monitoring of eosinophilic airway inflammation and asthma, and for identifying steroid responsive individuals whose chronic respiratory symptoms may be caused by airway inflammation. \par

## 2021-09-06 ENCOUNTER — RX RENEWAL (OUTPATIENT)
Age: 76
End: 2021-09-06

## 2021-09-20 ENCOUNTER — APPOINTMENT (OUTPATIENT)
Dept: ORTHOPEDIC SURGERY | Facility: CLINIC | Age: 76
End: 2021-09-20
Payer: MEDICARE

## 2021-09-20 VITALS
BODY MASS INDEX: 30.1 KG/M2 | WEIGHT: 215 LBS | DIASTOLIC BLOOD PRESSURE: 73 MMHG | HEIGHT: 71 IN | SYSTOLIC BLOOD PRESSURE: 145 MMHG | HEART RATE: 56 BPM

## 2021-09-20 PROCEDURE — 73564 X-RAY EXAM KNEE 4 OR MORE: CPT | Mod: RT

## 2021-09-20 PROCEDURE — 99203 OFFICE O/P NEW LOW 30 MIN: CPT

## 2021-09-20 RX ORDER — HYALURONATE SODIUM, STABILIZED 88 MG/4 ML
88 SYRINGE (ML) INTRAARTICULAR
Qty: 1 | Refills: 1 | Status: ACTIVE | COMMUNITY
Start: 2021-09-20

## 2021-09-28 NOTE — HISTORY OF PRESENT ILLNESS
[de-identified] : 75 year old male hx venous insufficiency and peripheral neuropathy  presents today with right knee pain x 15 years and left knee sine March 2021. No injury reported. He has been under the care of Dr. Berry has been managing to HA and cortisone injections. . Her man is no longer practicing on [place] and patient is her of continuation of care. He received last injection 11/2019. The pain is intermittent brought on with sitting and getting up from seated position. Denies bucking. \par \par The patient's past medical history, past surgical history, medications and allergies were reviewed by me today with the patient and documented accordingly. In addition, the patient's family and social history, which were noncontributory to this visit, were reviewed also.

## 2021-09-28 NOTE — PHYSICAL EXAM
[de-identified] : Oriented to time, place, person\par Mood: Normal\par Affect: Normal\par Appearance: Healthy, well appearing, no acute distress.\par Gait: Normal\par Assistive Devices: None\par \par Right knee exam\par \par Skin: Clean, dry, intact\par Inspection: Mild valgus malalignment, no masses, mild swelling, mild effusion\par Pulses: 2+ DP/PT pulses\par ROM: 0-125 degrees of flexion.  Positive pain with deep knee flexion/extension.\par Tenderness: Positive MJLT.  Positive LJLT. No pain over the patella facets. No pain to the quadriceps tendon. No pain to the patella tendon. No posterior knee tenderness.\par Stability: Stable\par Strength: 5/5 Q/H/TA/GS/EHL, without atrophy\par Neuro: In tact to light touch throughout, DTR's normal\par Additional tests: Mild McMurrays test, Negative patellar grind test. \par \par Left knee exam\par \par Skin: Clean, dry, intact\par Inspection: Mild valgus malalignment, no masses, mild swelling, mild effusion\par Pulses: 2+ DP/PT pulses\par ROM: 0-125 degrees of flexion.  Positive pain with deep knee flexion/extension.\par Tenderness: Positive MJLT.  Positive LJLT. No pain over the patella facets. No pain to the quadriceps tendon. No pain to the patella tendon. No posterior knee tenderness.\par Stability: Stable\par Strength: 5/5 Q/H/TA/GS/EHL, without atrophy\par Neuro: In tact to light touch throughout, DTR's normal\par Additional tests: Mild McMurrays test, Negative patellar grind test. \par  [de-identified] : \par The following radiographs were ordered and read by me during this patients visit. I reviewed each radiograph in detail with the patient and discussed the findings as highlighted below. \par \par 4 views of the right knee were obtained today that show no acute fracture or dislocation. There is mild medial, moderate lateral and mild patellofemoral degenerative change seen. There is mild valgus malalignment. No significant other obvious osseous abnormality, otherwise unremarkable.\par \par 4 views of the left knee were obtained today that show no acute fracture or dislocation. There is mild medial, moderate lateral and mild patellofemoral degenerative change seen. There is mild valgus malalignment. No significant other obvious osseous abnormality, otherwise unremarkable.

## 2021-09-28 NOTE — DISCUSSION/SUMMARY
[de-identified] : 75-year-old male with bilateral knee osteoarthritis\par \par I discussed the treatment of degenerative arthritis with the patient at length today, as well as the chronic degenerative process and likely progression of the disease. I described the spectrum of treatment from nonoperative modalities to total joint arthroplasty. Noninvasive and nonoperative treatment modalities include weight reduction, activity modification with low impact exercise, PRN use of acetaminophen or anti-inflammatory medication if tolerated, glucosamine/chondroitin supplements, and physical therapy. Further treatments can include corticosteroid injection and the use of hyaluronic acid injections. Definitive treatment would include total joint arthroplasty. \par \par The risks and benefits of each treatment options was discussed and all questions were answered.\par \par Recommendation: Trial of Visco supplementation. We'll obtain preauthorization prior to injection therapy.\par \par Followup: Once approved for injection therapy.\par \par \par **NB: Medication was Issued under circumstances where the provider (Dr. Osman Colon) reasonably determined that it would be impractical for the patient to obtain substances prescribed by electronic prescription (e-prescribe) given need for pre-authorization, and such delay would adversely impact the patient's medical condition. An exception letter will be mailed to the [facility] during the authorization process.**

## 2021-09-30 ENCOUNTER — APPOINTMENT (OUTPATIENT)
Dept: ORTHOPEDIC SURGERY | Facility: CLINIC | Age: 76
End: 2021-09-30
Payer: MEDICARE

## 2021-09-30 DIAGNOSIS — M17.0 BILATERAL PRIMARY OSTEOARTHRITIS OF KNEE: ICD-10-CM

## 2021-09-30 PROCEDURE — 20610 DRAIN/INJ JOINT/BURSA W/O US: CPT | Mod: 50

## 2021-10-01 PROBLEM — M17.0 PRIMARY LOCALIZED OSTEOARTHRITIS OF BOTH KNEES: Status: ACTIVE | Noted: 2021-09-20

## 2021-10-01 NOTE — PROCEDURE
[de-identified] : Injection: Right knee joint.\par Indication: Osteoarthritis. \par \par A discussion was had with the patient regarding this procedure and all questions were answered. All risks, benefits and alternatives were discussed. These included but were not limited to bleeding, infection, and allergic reaction. Alcohol was used to clean the skin, and betadine was used to sterilize and prep the area in the supero-lateral aspect of the right knee. Ethyl chloride spray was then used as a topical anesthetic. A 21-gauge needle was used to inject Monovisc into the knee. A sterile bandage was then applied. The patient tolerated the procedure well and there were no complications. \par \par Injection: Left knee joint.\par Indication: Osteoarthritis. \par \par A discussion was had with the patient regarding this procedure and all questions were answered. All risks, benefits and alternatives were discussed. These included but were not limited to bleeding, infection, and allergic reaction. Alcohol was used to clean the skin, and betadine was used to sterilize and prep the area in the supero-lateral aspect of the left knee. Ethyl chloride spray was then used as a topical anesthetic. A 21-gauge needle was used to inject Monovisc into the knee. A sterile bandage was then applied. The patient tolerated the procedure well and there were no complications.

## 2021-10-01 NOTE — ADDENDUM
[FreeTextEntry1] : This note was written by Minnie Almaraz on 09/30/2021 acting solely as a scribe for Dr. Osman Colon.\par \par All medical record entries made by the Scribe were at my, Dr. Osman Colon, direction and personally dictated by me on 09/30/2021. I have personally reviewed the chart and agree that the record accurately reflects my personal performance of the history, physical exam, assessment and plan.

## 2021-10-01 NOTE — END OF VISIT
[FreeTextEntry3] : 74 y/o with bilateral knee OA. \par \par The Monovisc injection was given today under sterile conditions into the bilateral knee joint without complication. I discussed the effects of this medication and how long it may provide benefit. Patient has obtained moderate immediate improvement. If no significant long-term benefit, the patient may elect for additional treatment strategies as previously discussed. However, if the patient obtains good relief of symptoms, the injection therapy series can be repeated over the next 6-12 months.\par \par We'll have the patient followup on an as-needed basis at this time.

## 2021-10-05 NOTE — HISTORY OF PRESENT ILLNESS
[FreeTextEntry1] : Mr. Blair is a 72 year old male with a history of abnormal chest CT, allergies, asthma, and chronic cough presenting to the office today for a sick visit . His chief complaint is cough\par - He went to [place]. The night before he came home, he noticed a tickle in his throat. he flew home the following day, after which he began to experience rhinorrhea. He notes that his symptoms progressed. On the third day of his sickness, he began to have a dry cough and wheeze. He has been having myalgia in his shoulder and neck, which is improving. \par - His cough has been dry, but now he notes that he notices that he brings sputum half way up but is unable to bring it up completely. \par - He is having difficulty sleeping\par - He followed up with his GP/ GI doctor Dr. Moreira. The flu was r/o. He was told to get a CXR if his symptoms did not improve by tomorrow. \par - He lost 18 pounds. \par - He recommends Dr. Melo at [facility] for marijuana therapy. \par - He states that he feels 10 years older\par - He feels that his Robitussin is loosening his stool. \par - His GERd symptoms are under control. 
normal for race

## 2021-10-09 ENCOUNTER — OUTPATIENT (OUTPATIENT)
Dept: OUTPATIENT SERVICES | Facility: HOSPITAL | Age: 76
LOS: 1 days | End: 2021-10-09
Payer: MEDICARE

## 2021-10-09 ENCOUNTER — APPOINTMENT (OUTPATIENT)
Dept: CT IMAGING | Facility: CLINIC | Age: 76
End: 2021-10-09
Payer: MEDICARE

## 2021-10-09 DIAGNOSIS — R91.1 SOLITARY PULMONARY NODULE: ICD-10-CM

## 2021-10-09 DIAGNOSIS — R93.89 ABNORMAL FINDINGS ON DIAGNOSTIC IMAGING OF OTHER SPECIFIED BODY STRUCTURES: ICD-10-CM

## 2021-10-09 PROCEDURE — G1004: CPT

## 2021-10-09 PROCEDURE — 71250 CT THORAX DX C-: CPT | Mod: ME

## 2021-10-09 PROCEDURE — 71250 CT THORAX DX C-: CPT | Mod: 26,ME

## 2021-10-18 ENCOUNTER — APPOINTMENT (OUTPATIENT)
Dept: PULMONOLOGY | Facility: CLINIC | Age: 76
End: 2021-10-18
Payer: MEDICARE

## 2021-10-18 VITALS
RESPIRATION RATE: 16 BRPM | BODY MASS INDEX: 29.12 KG/M2 | WEIGHT: 208 LBS | HEART RATE: 67 BPM | TEMPERATURE: 97.2 F | HEIGHT: 71 IN | SYSTOLIC BLOOD PRESSURE: 130 MMHG | DIASTOLIC BLOOD PRESSURE: 70 MMHG | OXYGEN SATURATION: 98 %

## 2021-10-18 DIAGNOSIS — Z01.812 ENCOUNTER FOR PREPROCEDURAL LABORATORY EXAMINATION: ICD-10-CM

## 2021-10-18 PROCEDURE — 90682 RIV4 VACC RECOMBINANT DNA IM: CPT

## 2021-10-18 PROCEDURE — 99214 OFFICE O/P EST MOD 30 MIN: CPT | Mod: 25

## 2021-10-18 PROCEDURE — 94618 PULMONARY STRESS TESTING: CPT

## 2021-10-18 PROCEDURE — G0008: CPT

## 2021-10-18 NOTE — HISTORY OF PRESENT ILLNESS
[FreeTextEntry1] : Mr. Blair is a 75 year old male with a history of asthma, allergy, GERD, abnormal CT, ?OSAS video calls to the office today for a follow up visit . His chief complaint is\par \par -he notes awaiting nuclear stress test for SOB \par -he notes usually not having issues with running on a treadmill but unable to keep up with higher speed\par -he notes since swelling of leg, unable ot run\par -he notes SOB when walking which limits walking as well as SOB on stairs\par -he notes light chest pressure since SOB started onset March\par -he notes heartburn issues until taking omeprazole\par -he notes losing 6 lbs due to terrible diarrhea\par -he notes vision decreased from baseline due to age\par -he notes sleep is interrupted with a total of 8 hrs \par -he notes getting knee shots in both knees which improved both knees\par -he notes balance decreased from baseline \par -he notes one episode of AFib at the end of sexual intercourse\par -he denies coughing and wheezing \par -he notes adding Gabapentin who did not improve condition \par -he notes life expectancy is now 12 years\par \par - He  denies any headaches, nausea, vomiting, fever, chills, sweats, chest pains, constipation, dysphagia, myalgia, dizziness, itchy eyes, itchy ears, heartburn, reflux, or sour taste in the mouth.

## 2021-10-18 NOTE — ADDENDUM
[FreeTextEntry1] : Documented by Abdias Gloria acting as a scribe for Dr. Sanford Muse on 10/18/2021 \par \par All medical record entries made by the Scribe were at my, Dr. Sanford Muse's, direction and personally dictated by me on 10/18/2021 . I have reviewed the chart and agree that the record accurately reflects my personal performance of the history, physical exam, assessment and plan. I have also personally directed, reviewed, and agree with the discharge instructions

## 2021-10-18 NOTE — ASSESSMENT
[FreeTextEntry1] : Mr. Blair is a 75 year old male who has a history of HTN, cervical radiculopathy, asthma, allergy, GERD, and ?OSAS. He presents to the office for a pulmonary follow up. He is currently plagued by KOENIG, poor balance- awaiting Nuclear stress test\par \par His shortness of breath is multifactorial due to:\par -poor mechanics of breathing \par -out of shape / overweight\par -pulmonary disease\par    - Asthma\par    - Abnormal CT \par -cardiac disease -  \par \par \par problem 1: Asthma (stable) - Dr. Nobles ([place]) \par -continue Singulair 10 mg QHS\par -continue Symbicort 160 2 inhalations BID \par -Asthma is believed to be caused by inherited (genetic) and environmental factor, but its exact cause is unknown. Asthma may be triggered by allergens, lung infections, or irritants in the air. Asthma triggers are different for each person\par -Inhaler technique reviewed as well as oral hygiene techniques reviewed with patient. Avoidance of cold air, extremes of temperature, rescue inhaler should be used before exercise. Order of medication reviewed with patient. Recommended use of a cool mist humidifier in the bedroom.\par \par Problem 1A: Cardiac (?anemia)\par -recommended follow up with cardiologist Dr Darnell Puente\par -nuclear stress test pending \par -complete iron studies \par \par problem 2: abnormal chest CT improved c/w inflammation \par -follow up chest CT (last 10/2021, next 10/2022) \par -confirmed hernia of the chest consistent with prior lung surgery \par CAT scans are the only radiological modality to identify abnormalities w/in the lungs with regards to nodules/masses/lymph nodes. Risks, benefits were reviewed in detail. The guidelines for abnormalities include follow up CT scans at various intervals which could range from 6 weeks to 1 year intervals. If there is a change for the worse then consideration for a biopsy will be considered if you are a candidate. Second opinion evaluation with a thoracic surgeon or an interventional radiologist could be offered. \par \par problem 3: ? ALINE/ ?REM Sleep Dz- poor hygiene\par -based on his fatigue, EDS, questionable snoring, and elevated Mallampati class\par -he is being recommended to have an at home sleep study\par -refused in lab Sleep study\par -recommended Oxy-Aid or Provent \par -Discussed the risks/associations with coronary artery disease, atrial fibrillation, arrhythmia, memory loss, issues with concentration, stroke risk, hypertension, nocturia, chronic reflux/Madera’s esophagus some but not all inclusive. Treatment options discussed including CPAP/BiPAP machine, oral appliance, ProVent therapy, Oxy-Aid by Respitec, new technologies, or positional sleep.\par -Treatment options discussed including CPAP/BiPAP machine, oral appliance, ProVent therapy, Oxy-Aid by Respitec, new technologies, or positional sleep.Recommended use of the CPAP machine for moderate (AHI >15), moderate to severe (AHI 15-30) and severe patients (AHI > 30). Recommended weight loss which can reduce AHI especially in weight loss of greater than 5% of BMI. Positional sleep is recommended in those with low AHI, low-moderate BMI, and younger age. For severe sleep apnea, the hypoglossal nerve stimulator was recommended as well.\par \par problem 4: allergic rhinitis (quiet)\par - continue Xyzal 5 mg QHS\par -continue to use Astelin .15 1 sniff each nostril BID\par - continue Nasacort  AM 1 BID  or Flonase 1 sniff/nostril BID\par -continue to use OTC antihistamines / Clarinex 5mg QHS prn\par -Environmental measures for allergies were encouraged including mattress and pillow cover, air purifier, and environmental controls.\par \par problem 5: overweight\par -Weight loss, exercise, and diet control were discussed and are highly encouraged. Treatment options were given such as, aqua therapy, and contacting a nutritionist. Recommended to use the elliptical, stationary bike, less use of treadmill. Obesity is associated with worsening asthma, shortness of breath, and potential for cardiac disease, diabetes, and other underlying medical conditions.\par \par problem 5A: Poor Balance\par -recommended prescription for gait training and balance therapy (refused currently due to COVID anxiety)\par \par problem 6: GERD\par  -add Omeprazole 40 mg QAM, pre-meal  \par -continue to use Pepcid 40 mg QHS\par -Rule of 2s: avoid eating too much, eating too late, eating too spicy, eating two hours before bed\par -Things to avoid including overeating, spicy foods, tight clothing, eating within three hours of bed, this list is not all inclusive. \par -For treatment of reflux, possible options discussed including diet control, H2 blockers, PPIs, as well as coating motility agents discussed as treatment options. Timing of meals and proximity of last meal to sleep were discussed. If symptoms persist, a formal gastrointestinal evaluation is needed. \par \par problem 7: chest indentation\par -felt to be related to prior surgery\par -he is being recommended to have cardiac therapy\par \par problem 8 : poor breathing mechanics\par -Proper breathing techniques were reviewed with an emphasis of exhalation. Patient instructed to breath in for 1 second and out for four seconds. Patient was encouraged to not talk while walking. \par \par Problem 9: Health Maintenance/COVID19 Precautions:- ?Metabolic Syndrome\par -s/p Moderna COVID 19 vaccine x 2 \par -complete renal evaluation\par -complete Endocrine evaluation\par \par -OTC Vitamin C 500mg BID \par -OTC Quercetin 250-500mg BID \par -OTC Zinc 75-100mg per day \par -OTC Melatonin 1or 2mg a night \par -OTC Vitamin D 1-4000mg per day\par -Tonic Water 8oz\par -Recommended to Stay Hydrated (At least a gallon/day)\par \par problem 10: health maintenance \par -recommended Neuro Renew\par -recommended yearly flu -10/21/2021\par -recommended strep pneumonia vaccines: Prevnar-13 vaccine, followed by Pneumo vaccine 23 one year following\par -recommended early intervention for URIs\par -recommended regular osteoporosis evaluations\par -recommended early dermatological evaluations\par -recommended after the age of 50 to the age of 70, colonoscopy every 5 years \par \par problem 11: poor sleep hygiene ( in place) \par -recommended to wear sunglasses 30 minutes before bed \par -Good sleep hygiene was encouraged including avoiding watching television an hour before bed, keeping caffeine at a low,  avoiding reading, television, or anything, in bed, no drinking any liquids three hours before bedtime, and only getting into bed when tired and ready for sleep. \par \par F/U in 4 months\par He is encouraged to call with any changes, concerns, or questions. \par

## 2021-10-18 NOTE — PROCEDURE
[FreeTextEntry1] : CT (10.9.2021) revealed 1.  Since 4/18/2015, a 4 x 6 mm opacity in the left upper lobe is unchanged.\par 2.  No change in the bilateral calcified pulmonary nodules since 4/18/1950.\par \par Blood work(9.17.2021)revealed HDL 38 LDL 95\par \par 6 minute walk test reveals a low saturation of 93% with no evidence of dyspnea or fatigue; walked  391.2 meters. Patient notes chest pressure after 3 minutes \par

## 2021-10-18 NOTE — PHYSICAL EXAM
[No Acute Distress] : no acute distress [Normal Oropharynx] : normal oropharynx [Normal Appearance] : normal appearance [III] : Mallampati Class: III [No Neck Mass] : no neck mass [Normal Rate/Rhythm] : normal rate/rhythm [Normal S1, S2] : normal s1, s2 [No Murmurs] : no murmurs [No Resp Distress] : no resp distress [Clear to Auscultation Bilaterally] : clear to auscultation bilaterally [No Abnormalities] : no abnormalities [Benign] : benign [No Clubbing] : no clubbing [Normal Gait] : normal gait [No Cyanosis] : no cyanosis [FROM] : FROM [1+ Pitting] : 1+ pitting [Normal Color/ Pigmentation] : normal color/ pigmentation [No Focal Deficits] : no focal deficits [Oriented x3] : oriented x3 [Normal Affect] : normal affect [TextBox_2] : ow [TextBox_105] : 1+ Lower Extremity Edema  [TextBox_68] : I:E ratio 1:3; clear

## 2021-10-30 ENCOUNTER — TRANSCRIPTION ENCOUNTER (OUTPATIENT)
Age: 76
End: 2021-10-30

## 2021-11-01 ENCOUNTER — APPOINTMENT (OUTPATIENT)
Dept: PULMONOLOGY | Facility: CLINIC | Age: 76
End: 2021-11-01
Payer: MEDICARE

## 2021-11-01 PROCEDURE — 95012 NITRIC OXIDE EXP GAS DETER: CPT

## 2021-11-01 PROCEDURE — 94729 DIFFUSING CAPACITY: CPT

## 2021-11-01 PROCEDURE — 94010 BREATHING CAPACITY TEST: CPT

## 2021-11-01 PROCEDURE — 94727 GAS DIL/WSHOT DETER LNG VOL: CPT

## 2021-11-05 ENCOUNTER — NON-APPOINTMENT (OUTPATIENT)
Age: 76
End: 2021-11-05

## 2021-12-21 ENCOUNTER — RX RENEWAL (OUTPATIENT)
Age: 76
End: 2021-12-21

## 2022-01-31 ENCOUNTER — RX RENEWAL (OUTPATIENT)
Age: 77
End: 2022-01-31

## 2022-01-31 RX ORDER — MONTELUKAST 10 MG/1
10 TABLET, FILM COATED ORAL
Qty: 90 | Refills: 0 | Status: ACTIVE | COMMUNITY
Start: 2020-05-19 | End: 1900-01-01

## 2022-05-20 ENCOUNTER — APPOINTMENT (OUTPATIENT)
Dept: PULMONOLOGY | Facility: CLINIC | Age: 77
End: 2022-05-20
Payer: MEDICARE

## 2022-05-20 ENCOUNTER — NON-APPOINTMENT (OUTPATIENT)
Age: 77
End: 2022-05-20

## 2022-05-20 VITALS
TEMPERATURE: 95.5 F | HEIGHT: 71 IN | OXYGEN SATURATION: 98 % | RESPIRATION RATE: 17 BRPM | HEART RATE: 68 BPM | WEIGHT: 184 LBS | BODY MASS INDEX: 25.76 KG/M2 | DIASTOLIC BLOOD PRESSURE: 65 MMHG | SYSTOLIC BLOOD PRESSURE: 120 MMHG

## 2022-05-20 DIAGNOSIS — R91.1 SOLITARY PULMONARY NODULE: ICD-10-CM

## 2022-05-20 PROCEDURE — 99214 OFFICE O/P EST MOD 30 MIN: CPT | Mod: 25

## 2022-05-20 PROCEDURE — 94010 BREATHING CAPACITY TEST: CPT

## 2022-05-20 PROCEDURE — 95012 NITRIC OXIDE EXP GAS DETER: CPT

## 2022-05-20 RX ORDER — TICAGRELOR 60 MG/1
TABLET ORAL
Refills: 0 | Status: ACTIVE | COMMUNITY

## 2022-05-20 RX ORDER — ATORVASTATIN CALCIUM 80 MG/1
TABLET, FILM COATED ORAL
Refills: 0 | Status: ACTIVE | COMMUNITY

## 2022-05-20 NOTE — ASSESSMENT
[FreeTextEntry1] : Mr. Blair is a 76 year old male who has a history of HTN, cervical radiculopathy, CAD s/p stent 11/2021, asthma, allergy, GERD, and ?OSAS. He presents to the office for a pulmonary follow up. He is currently plagued by OKENIG; intermittent Chest pain; poor balance \par \par His shortness of breath is multifactorial due to:\par -poor mechanics of breathing \par -out of shape / overweight\par -pulmonary disease\par    - Asthma\par    - Abnormal CT \par -cardiac disease -  \par \par \par problem 1: Asthma (stable) - Dr. Nobles ([place]) \par -continue Singulair 10 mg QHS\par -continue Symbicort 160 2 inhalations BID PRN \par -Asthma is believed to be caused by inherited (genetic) and environmental factor, but its exact cause is unknown. Asthma may be triggered by allergens, lung infections, or irritants in the air. Asthma triggers are different for each person\par -Inhaler technique reviewed as well as oral hygiene techniques reviewed with patient. Avoidance of cold air, extremes of temperature, rescue inhaler should be used before exercise. Order of medication reviewed with patient. Recommended use of a cool mist humidifier in the bedroom.\par \par Problem 1A: Cardiac (CAD) \par -recommended follow up with cardiologist Dr Darnell Puente /  (Stent / Stemi 11/2021) \par \par Problem 1B: Chest Pain - ?MS\par - recommended Topricin cream \par \par \par problem 2: abnormal chest CT improved c/w inflammation \par -follow up chest CT (last 10/2021, next 10/2022) \par -confirmed hernia of the chest consistent with prior lung surgery \par CAT scans are the only radiological modality to identify abnormalities w/in the lungs with regards to nodules/masses/lymph nodes. Risks, benefits were reviewed in detail. The guidelines for abnormalities include follow up CT scans at various intervals which could range from 6 weeks to 1 year intervals. If there is a change for the worse then consideration for a biopsy will be considered if you are a candidate. Second opinion evaluation with a thoracic surgeon or an interventional radiologist could be offered. \par \par problem 3: ? ALINE/ ?REM Sleep Dz- poor hygiene\par -based on his fatigue, EDS, questionable snoring, and elevated Mallampati class\par -he is being recommended to have an at home sleep study\par -refused in lab Sleep study\par -recommended Oxy-Aid or Provent \par -Discussed the risks/associations with coronary artery disease, atrial fibrillation, arrhythmia, memory loss, issues with concentration, stroke risk, hypertension, nocturia, chronic reflux/Madera’s esophagus some but not all inclusive. Treatment options discussed including CPAP/BiPAP machine, oral appliance, ProVent therapy, Oxy-Aid by Respitec, new technologies, or positional sleep.\par -Treatment options discussed including CPAP/BiPAP machine, oral appliance, ProVent therapy, Oxy-Aid by Respitec, new technologies, or positional sleep.Recommended use of the CPAP machine for moderate (AHI >15), moderate to severe (AHI 15-30) and severe patients (AHI > 30). Recommended weight loss which can reduce AHI especially in weight loss of greater than 5% of BMI. Positional sleep is recommended in those with low AHI, low-moderate BMI, and younger age. For severe sleep apnea, the hypoglossal nerve stimulator was recommended as well.\par \par problem 4: allergic rhinitis (quiet)\par - continue Xyzal 5 mg QHS\par -continue to use Astelin .15 1 sniff each nostril BID\par - continue Nasacort  AM 1 BID  or Flonase 1 sniff/nostril BID\par -continue to use OTC antihistamines / Clarinex 5mg QHS prn\par -Environmental measures for allergies were encouraged including mattress and pillow cover, air purifier, and environmental controls.\par \par problem 5: overweight - improved \par -Weight loss, exercise, and diet control were discussed and are highly encouraged. Treatment options were given such as, aqua therapy, and contacting a nutritionist. Recommended to use the elliptical, stationary bike, less use of treadmill. Obesity is associated with worsening asthma, shortness of breath, and potential for cardiac disease, diabetes, and other underlying medical conditions.\par \par problem 5A: Poor Balance\par -recommended prescription for gait training and balance therapy (refused currently due to COVID anxiety)\par \par problem 6: GERD\par  -continue Omeprazole 40 mg QAM, pre-meal  \par -continue to use Pepcid 40 mg QHS\par -Rule of 2s: avoid eating too much, eating too late, eating too spicy, eating two hours before bed\par -Things to avoid including overeating, spicy foods, tight clothing, eating within three hours of bed, this list is not all inclusive. \par -For treatment of reflux, possible options discussed including diet control, H2 blockers, PPIs, as well as coating motility agents discussed as treatment options. Timing of meals and proximity of last meal to sleep were discussed. If symptoms persist, a formal gastrointestinal evaluation is needed. \par \par problem 7: chest indentation\par -felt to be related to prior surgery\par -he is being recommended to have cardiac therapy\par \par problem 8 : poor breathing mechanics\par -Recommended Wim Hof and Buteyko breathing techniques \par -Proper breathing techniques were reviewed with an emphasis of exhalation. Patient instructed to breath in for 1 second and out for four seconds. Patient was encouraged to not talk while walking. \par \par Problem 9: Health Maintenance/COVID19 Precautions:- ?Metabolic Syndrome\par -s/p Moderna COVID 19 vaccine x 4 \par -complete renal evaluation\par -complete Endocrine evaluation\par \par -OTC Vitamin C 500mg BID \par -OTC Quercetin 250-500mg BID \par -OTC Zinc 75-100mg per day \par -OTC Melatonin 1or 2mg a night \par -OTC Vitamin D 1-4000mg per day\par -Tonic Water 8oz\par -Recommended to Stay Hydrated (At least a gallon/day)\par \par problem 10: health maintenance \par -recommended Neuro Renew\par -recommended yearly flu -10/21/2021\par -recommended strep pneumonia vaccines: Prevnar-13 vaccine, followed by Pneumo vaccine 23 one year following\par -recommended early intervention for URIs\par -recommended regular osteoporosis evaluations\par -recommended early dermatological evaluations\par -recommended after the age of 50 to the age of 70, colonoscopy every 5 years \par \par problem 11: poor sleep hygiene ( in place) \par -recommended to wear sunglasses 30 minutes before bed \par -Good sleep hygiene was encouraged including avoiding watching television an hour before bed, keeping caffeine at a low,  avoiding reading, television, or anything, in bed, no drinking any liquids three hours before bedtime, and only getting into bed when tired and ready for sleep. \par \par F/U in 4 months\par He is encouraged to call with any changes, concerns, or questions. \par

## 2022-05-20 NOTE — PROCEDURE
[FreeTextEntry1] : PFT revealed normal flows, with a FEV1 of 2.72 L, which is 84% of predicted, with a normal flow volume loop\par \par Feno was 24 ; a normal value being less than 25. Fractional exhaled nitric oxide (FENO) is regarded as a simple, noninvasive method for assessing eosinophilic airway inflammation. Produced by a variety of cells within the lung, nitric oxide (NO) concentrations are generally low in healthy individuals. However, high concentrations of NO appear to be involved in nonspecific host defense mechanisms and chronic inflammatory  diseases such as asthma. The American Thoracic Society (ATS) therefore recommended using FENO to aid in the diagnosis and monitoring of eosinophilic airway inflammation and asthma, and for identifying steroid responsive individuals whose chronic respiratory symptoms may be caused by airway inflammation \par

## 2022-05-20 NOTE — ADDENDUM
[FreeTextEntry1] : Documented by Pearl Guillen acting as a scribe for Dr. Sanford Muse on (05/20/2022).\par \par All medical record entries made by the Scribe were at my, Dr. Sanford Muse's, direction and personally dictated by me on (05/20/2022). I have reviewed the chart and agree that the record accurately reflects my personal performance of the history, physical exam, assessment and plan. I have also personally directed, reviewed, and agree with the discharge instructions.\par

## 2022-05-20 NOTE — PHYSICAL EXAM
[No Acute Distress] : no acute distress [Normal Oropharynx] : normal oropharynx [II] : Mallampati Class: II [Normal Appearance] : normal appearance [No Neck Mass] : no neck mass [Normal Rate/Rhythm] : normal rate/rhythm [Normal S1, S2] : normal s1, s2 [No Murmurs] : no murmurs [No Resp Distress] : no resp distress [Clear to Auscultation Bilaterally] : clear to auscultation bilaterally [No Abnormalities] : no abnormalities [Benign] : benign [Normal Gait] : normal gait [No Clubbing] : no clubbing [No Cyanosis] : no cyanosis [FROM] : FROM [1+ Pitting] : 1+ pitting [Normal Color/ Pigmentation] : normal color/ pigmentation [No Focal Deficits] : no focal deficits [Oriented x3] : oriented x3 [Normal Affect] : normal affect [TextBox_2] : ow [TextBox_68] : I:E ratio 1:3; clear  [TextBox_105] : 1+ Lower Extremity Edema

## 2022-05-20 NOTE — HISTORY OF PRESENT ILLNESS
[FreeTextEntry1] : Mr. Blair is a 76 year old male with a history of asthma, allergy, GERD, abnormal CT, ?OSAS video calls to the office today for a follow up visit . His chief complaint is\par - he has lost about 30 lbs \par - he notes he has cut down his portions in half with his meals and cut down his soda intake, as well as his cookies. \par - he notes he had a heart attack. \par - he went to get an echo and stress test, it was fine. But he still felt off. He went and got a 99 on his RCA. \par - he had an angiogram done and went home and he started to feel nauseous. He called 911 and the ER told him he was having a heart attack, and had a second stent put in. \par - Then went he went to see , he was told since his heart attack was caught quickly there was no real damage. \par - right now he feels okay \par - he has been playing a lot of [employer]. \par - may 5th he had on the [employer]ball court, he felt some pressure in his chest. He felt it 3x that day and considered calling 911. Though b/c he did not feel any other symptoms of a HA he did not call. He later went to his other cardiologist and was told he was fine. He then went to see  and was told he's all good. \par - Now he feels like he never knows if he's having a heart attack. He always feels some sort of symptom. He has aches and pains, and has a high stress level. \par - No heartburn/reflux symptoms- controlled. \par - bowels have been okay \par - he notes his balance is not too well. He will be going to his neurologist next week. He is interested in balance rx. \par - he has been walking in the pool for exercise as well. \par - about a month ago he stopped taking his inhalers since he did not feel any asthma symptoms. \par - no wheezing \par - s/p 4th vaccine for COVID. \par - patient denies any headaches, nausea, vomiting, fever, chills, sweats, coughing, wheezing, diarrhea, constipation, dysphagia, myalgias, dizziness, leg swelling, leg pain, itchy eyes, itchy ears, heartburn, reflux or sour taste in the mouth\par

## 2022-06-15 ENCOUNTER — RX RENEWAL (OUTPATIENT)
Age: 77
End: 2022-06-15

## 2022-06-15 RX ORDER — BUDESONIDE AND FORMOTEROL FUMARATE DIHYDRATE 160; 4.5 UG/1; UG/1
160-4.5 AEROSOL RESPIRATORY (INHALATION) TWICE DAILY
Qty: 1 | Refills: 6 | Status: ACTIVE | COMMUNITY
Start: 2019-11-04 | End: 1900-01-01

## 2022-06-15 RX ORDER — BUDESONIDE AND FORMOTEROL FUMARATE DIHYDRATE 160; 4.5 UG/1; UG/1
160-4.5 AEROSOL RESPIRATORY (INHALATION) TWICE DAILY
Qty: 3 | Refills: 1 | Status: ACTIVE | COMMUNITY
Start: 2018-10-09 | End: 1900-01-01

## 2022-07-26 ENCOUNTER — RX RENEWAL (OUTPATIENT)
Age: 77
End: 2022-07-26

## 2022-07-28 RX ORDER — LEVOCETIRIZINE DIHYDROCHLORIDE 5 MG/1
5 TABLET ORAL
Qty: 90 | Refills: 3 | Status: ACTIVE | COMMUNITY
Start: 2020-04-23 | End: 1900-01-01

## 2022-09-01 ENCOUNTER — NON-APPOINTMENT (OUTPATIENT)
Age: 77
End: 2022-09-01

## 2022-09-07 ENCOUNTER — NON-APPOINTMENT (OUTPATIENT)
Age: 77
End: 2022-09-07

## 2022-09-07 ENCOUNTER — APPOINTMENT (OUTPATIENT)
Dept: PULMONOLOGY | Facility: CLINIC | Age: 77
End: 2022-09-07

## 2022-09-07 VITALS
HEIGHT: 71 IN | TEMPERATURE: 97.8 F | SYSTOLIC BLOOD PRESSURE: 128 MMHG | RESPIRATION RATE: 16 BRPM | OXYGEN SATURATION: 98 % | DIASTOLIC BLOOD PRESSURE: 64 MMHG | WEIGHT: 174 LBS | BODY MASS INDEX: 24.36 KG/M2 | HEART RATE: 58 BPM

## 2022-09-07 DIAGNOSIS — G47.419 NARCOLEPSY W/OUT CATAPLEXY: ICD-10-CM

## 2022-09-07 PROCEDURE — 99214 OFFICE O/P EST MOD 30 MIN: CPT | Mod: CS,25

## 2022-09-07 PROCEDURE — 94010 BREATHING CAPACITY TEST: CPT

## 2022-09-07 NOTE — HISTORY OF PRESENT ILLNESS
[FreeTextEntry1] : Mr. Blair is a 76 year old male with a history of asthma, allergy, GERD, abnormal CT, ?OSAS video calls to the office today for a follow up visit . His chief complaint is\par \par -he notes s/p Covid 19 8/2022 with pharyngitis, coughing, and fatigue\par -he notes s/p course of paxlovid  which resolved Covid 19 symptoms\par -he notes off brilinta and statin for 7 days\par -he notes unproductive coughing relapsed on 7th day post paxlovid\par -he notes mild diarrhea \par -he notes metallic taste while taking paxlovid that has since resolved\par -he notes mild itchy eyes and ears \par -he notes improved diet \par -he notes decreased diet \par -he notes loss of 44 lbs in the past 11 lbs and now 172 lbs \par -he notes s/p endoscopy and colonoscopy and CT abdomen and pelvis due to umbilical pain \par -he notes condition improved with inhaler and Singulair \par \par -he denies any arthralgias, chest pain, chest pressure, chills, constipation, coughing, dizziness, dysphagia, fever, headaches, heartburn, reflux, leg swelling, leg pain, myalgias, nausea, palpitations, sweats, vomiting, wheezing or sour taste in the mouth.

## 2022-09-07 NOTE — PROCEDURE
[FreeTextEntry1] : PFT reveals normal flows, with an FEV1 of 2.92 L, which is 90% of predicted, with a normal flow volume loop. \par \par FENO was ; a normal value being less than 25\par Fractional exhaled nitric oxide (FENO) is regarded as a simple, noninvasive method for assessing eosinophilic airway inflammation. Produced by a variety of cells within the lung, nitric oxide (NO) concentrations are generally low in healthy individuals. However, high concentrations of NO appear to be involved in nonspecific host defense mechanisms and chronic inflammatory diseases such as asthma. The American Thoracic Society (ATS) therefore has recommended using FENO to aid in the diagnosis and monitoring of eosinophilic airway inflammation and asthma, and for identifying steroid responsive individuals whose chronic respiratory symptoms may be caused by airway inflammation.

## 2022-09-07 NOTE — ASSESSMENT
[FreeTextEntry1] : Mr. Blair is a 76 year old male who has a history of HTN, cervical radiculopathy, CAD s/p stent 11/2021, asthma, allergy, GERD, and ?OSAS. He presents to the office for a pulmonary follow up. He is currently plagued by post Covid 19 cough/ non purposeful weight loss \par \par His shortness of breath is multifactorial due to:\par -poor mechanics of breathing \par -out of shape / overweight\par -pulmonary disease\par    - Asthma\par    - Abnormal CT \par -cardiac disease -  \par \par problem 1: Asthma- active- Dr. Nobles ([place]) \par -continue Singulair 10 mg QHS\par -continue Symbicort 160 2 inhalations BID PRN \par -Asthma is believed to be caused by inherited (genetic) and environmental factor, but its exact cause is unknown. Asthma may be triggered by allergens, lung infections, or irritants in the air. Asthma triggers are different for each person\par -Inhaler technique reviewed as well as oral hygiene techniques reviewed with patient. Avoidance of cold air, extremes of temperature, rescue inhaler should be used before exercise. Order of medication reviewed with patient. Recommended use of a cool mist humidifier in the bedroom.\par \par Problem 1A: Cardiac (CAD) \par -recommended follow up with cardiologist Dr Darnell Puente /  (Stent / Stemi 11/2021) \par \par Problem 1B: Chest Pain - ?MS\par - recommended Topricin cream \par \par Problem 1C: Covid 19 8/2022\par -s/p course of paxlovid \par \par problem 2: abnormal chest CT improved c/w inflammation \par -follow up chest CT (last 10/2021, next 10/2022) \par -confirmed hernia of the chest consistent with prior lung surgery \par CAT scans are the only radiological modality to identify abnormalities w/in the lungs with regards to nodules/masses/lymph nodes. Risks, benefits were reviewed in detail. The guidelines for abnormalities include follow up CT scans at various intervals which could range from 6 weeks to 1 year intervals. If there is a change for the worse then consideration for a biopsy will be considered if you are a candidate. Second opinion evaluation with a thoracic surgeon or an interventional radiologist could be offered. \par \par problem 3: ? ALNIE/ ?REM Sleep Dz- poor hygiene\par -complete multiple sleep latency test\par -based on his fatigue, EDS, questionable snoring, and elevated Mallampati class\par -he is being recommended to have an at home sleep study\par -refused in lab Sleep study\par -recommended Oxy-Aid or Provent \par -Discussed the risks/associations with coronary artery disease, atrial fibrillation, arrhythmia, memory loss, issues with concentration, stroke risk, hypertension, nocturia, chronic reflux/[place]’s esophagus some but not all inclusive. Treatment options discussed including CPAP/BiPAP machine, oral appliance, ProVent therapy, Oxy-Aid by Respitec, new technologies, or positional sleep.\par -Treatment options discussed including CPAP/BiPAP machine, oral appliance, ProVent therapy, Oxy-Aid by Respitec, new technologies, or positional sleep.Recommended use of the CPAP machine for moderate (AHI >15), moderate to severe (AHI 15-30) and severe patients (AHI > 30). Recommended weight loss which can reduce AHI especially in weight loss of greater than 5% of BMI. Positional sleep is recommended in those with low AHI, low-moderate BMI, and younger age. For severe sleep apnea, the hypoglossal nerve stimulator was recommended as well.\par \par problem 4: allergic rhinitis (quiet)\par - continue Xyzal 5 mg QHS\par -continue to use Astelin .15 1 sniff each nostril BID\par - continue Nasacort  AM 1 BID  or Flonase 1 sniff/nostril BID\par -continue to use OTC antihistamines / Clarinex 5mg QHS prn\par -Environmental measures for allergies were encouraged including mattress and pillow cover, air purifier, and environmental controls.\par \par problem 5: overweight - improved \par -Weight loss, exercise, and diet control were discussed and are highly encouraged. Treatment options were given such as, aqua therapy, and contacting a nutritionist. Recommended to use the elliptical, stationary bike, less use of treadmill. Obesity is associated with worsening asthma, shortness of breath, and potential for cardiac disease, diabetes, and other underlying medical conditions.\par \par problem 5A: Poor Balance\par -recommended prescription for gait training and balance therapy (refused currently due to COVID anxiety)\par \par problem 6: GERD\par  -continue Omeprazole 40 mg QAM, pre-meal  \par -continue to use Pepcid 40 mg QHS\par -Rule of 2s: avoid eating too much, eating too late, eating too spicy, eating two hours before bed\par -Things to avoid including overeating, spicy foods, tight clothing, eating within three hours of bed, this list is not all inclusive. \par -For treatment of reflux, possible options discussed including diet control, H2 blockers, PPIs, as well as coating motility agents discussed as treatment options. Timing of meals and proximity of last meal to sleep were discussed. If symptoms persist, a formal gastrointestinal evaluation is needed. \par \par problem 7: chest indentation\par -felt to be related to prior surgery\par -he is being recommended to have cardiac therapy\par \par problem 8 : poor breathing mechanics\par -Recommended Wim Hof and [place] breathing techniques \par -Proper breathing techniques were reviewed with an emphasis of exhalation. Patient instructed to breath in for 1 second and out for four seconds. Patient was encouraged to not talk while walking. \par \par Problem 9: Health Maintenance/COVID19 Precautions:- ?Metabolic Syndrome\par -s/p Covid 19 8/2022\par -s/p Moderna COVID 19 vaccine x 4 \par -complete renal evaluation\par -complete Endocrine evaluation\par \par -OTC Vitamin C 500mg BID \par -OTC Quercetin 250-500mg BID \par -OTC Zinc 75-100mg per day \par -OTC Melatonin 1or 2mg a night \par -OTC Vitamin D 1-4000mg per day\par -Tonic Water 8oz\par -Recommended to Stay Hydrated (At least a gallon/day)\par \par problem 10: health maintenance \par -recommended Neuro Renew\par -recommended yearly flu -10/21/2021\par -recommended strep pneumonia vaccines: Prevnar-13 vaccine, followed by Pneumo vaccine 23 one year following (completed) \par -recommended early intervention for URIs\par -recommended regular osteoporosis evaluations\par -recommended early dermatological evaluations\par -recommended after the age of 50 to the age of 70, colonoscopy every 5 years \par \par problem 11: poor sleep hygiene ( in place) \par -recommended to wear sunglasses 30 minutes before bed \par -Good sleep hygiene was encouraged including avoiding watching television an hour before bed, keeping caffeine at a low,  avoiding reading, television, or anything, in bed, no drinking any liquids three hours before bedtime, and only getting into bed when tired and ready for sleep. \par \par F/U in 4 months\par He is encouraged to call with any changes, concerns, or questions. \par

## 2022-09-07 NOTE — ADDENDUM
[FreeTextEntry1] : Documented by Abdias Gloria acting as a scribe for Dr. Sanford Muse on 09/07/2022 .\par \par All medical record entries made by the Scribe were at my, Dr. Sanford Muse's, direction and personally dictated by me on 09/07/2022. I have reviewed the chart and agree that the record accurately reflects my personal performance of the history, physical exam, assessment and plan. I have also personally directed, reviewed, and agree with the discharge instructions.

## 2022-09-07 NOTE — REASON FOR VISIT
[Follow-Up] : a follow-up visit [FreeTextEntry1] : SOB, Asthma, allergy, GERD, abnormal CT, ?OSAS, Covid 19 8/2022

## 2022-09-07 NOTE — PHYSICAL EXAM
[No Acute Distress] : no acute distress [Normal Oropharynx] : normal oropharynx [III] : Mallampati Class: III [Normal Appearance] : normal appearance [No Neck Mass] : no neck mass [Normal Rate/Rhythm] : normal rate/rhythm [Normal S1, S2] : normal s1, s2 [No Murmurs] : no murmurs [No Resp Distress] : no resp distress [Clear to Auscultation Bilaterally] : clear to auscultation bilaterally [No Abnormalities] : no abnormalities [Benign] : benign [Normal Gait] : normal gait [No Clubbing] : no clubbing [No Cyanosis] : no cyanosis [FROM] : FROM [1+ Pitting] : 1+ pitting [Normal Color/ Pigmentation] : normal color/ pigmentation [No Focal Deficits] : no focal deficits [Oriented x3] : oriented x3 [Normal Affect] : normal affect [TextBox_2] : ow [TextBox_54] : 1/6 systolic murmur  [TextBox_68] : I:E ratio 1:3; clear  [TextBox_105] : 1+ Lower Extremity Edema

## 2022-10-06 ENCOUNTER — APPOINTMENT (OUTPATIENT)
Dept: PULMONOLOGY | Facility: CLINIC | Age: 77
End: 2022-10-06

## 2022-10-06 VITALS
TEMPERATURE: 97 F | HEART RATE: 54 BPM | SYSTOLIC BLOOD PRESSURE: 120 MMHG | WEIGHT: 177 LBS | OXYGEN SATURATION: 98 % | BODY MASS INDEX: 24.78 KG/M2 | RESPIRATION RATE: 17 BRPM | HEIGHT: 71 IN | DIASTOLIC BLOOD PRESSURE: 60 MMHG

## 2022-10-06 PROCEDURE — 95012 NITRIC OXIDE EXP GAS DETER: CPT

## 2022-10-06 PROCEDURE — 94729 DIFFUSING CAPACITY: CPT

## 2022-10-06 PROCEDURE — 94727 GAS DIL/WSHOT DETER LNG VOL: CPT

## 2022-10-06 PROCEDURE — 99214 OFFICE O/P EST MOD 30 MIN: CPT | Mod: CS,25

## 2022-10-06 PROCEDURE — 94010 BREATHING CAPACITY TEST: CPT

## 2022-10-06 NOTE — HISTORY OF PRESENT ILLNESS
[FreeTextEntry1] : Mr. Blair is a 76 year old male with a history of asthma, allergy, GERD, abnormal CT, ?OSAS video calls to the office today for a follow up visit . His chief complaint is\par \par -he notes losing 44 lbs\par -he notes periumbilical pain \par -s/p endoscopy, colonoscopy, CT of abdomen\par -s/p polyp removal\par -s/p colitis \par -he notes colon wall thickening\par -he notes his breathing has been worse\par -he notes KOENIG on stairs\par -s/p COVID-19 8/2022\par -he notes chest pain intermittently on exertion during pickleball\par \par -patient denies any headaches, nausea, vomiting, fever, chills, sweats, chest pressure, palpitations, coughing, wheezing, fatigue, diarrhea, constipation, dysphagia, myalgias, dizziness, leg swelling, leg pain, itchy eyes, itchy ears, heartburn, reflux or sour taste in the mouth

## 2022-10-06 NOTE — ADDENDUM
[FreeTextEntry1] : Documented by Javier Arriola acting as a scribe for Dr. Sanford Muse on 10/06/2022.\par \par All medical record entries made by the Scribe were at my, Dr. Sanford Muse's, direction and personally dictated by me on 10/06/2022. I have reviewed the chart and agree that the record accurately reflects my personal performance of the history, physical exam, assessment and plan. I have also personally directed, reviewed, and agree with the discharge instructions.

## 2022-10-06 NOTE — ASSESSMENT
[FreeTextEntry1] : Mr. Blair is a 76 year old male who has a history of HTN, cervical radiculopathy, CAD s/p stent 11/2021, asthma, allergy, GERD, and ?OSAS. He presents to the office for a pulmonary follow up. He is currently plagued by Knapp (sex/stairs)\par His shortness of breath is multifactorial due to:\par -poor mechanics of breathing \par -out of shape / overweight\par -pulmonary disease\par    - Asthma\par    - Abnormal CT \par -cardiac disease -  / lCay\par \par problem 1: Asthma- active- Dr. Nobles ([place]) \par -continue Singulair 10 mg QHS\par -continue Symbicort 160 2 inhalations BID PRN \par -Asthma is believed to be caused by inherited (genetic) and environmental factor, but its exact cause is unknown. Asthma may be triggered by allergens, lung infections, or irritants in the air. Asthma triggers are different for each person\par -Inhaler technique reviewed as well as oral hygiene techniques reviewed with patient. Avoidance of cold air, extremes of temperature, rescue inhaler should be used before exercise. Order of medication reviewed with patient. Recommended use of a cool mist humidifier in the bedroom.\par \par Problem 1A: Cardiac (CAD) -? active 10/2022 (exertion)\par -recommended follow up with cardiologist Dr aDrnell Puente /  (Stent / Stemi 11/2021)  - revisit stress test\par \par Problem 1B: Chest Pain - ?MS\par - recommended Topricin cream \par \par Problem 1C: Covid 19 8/2022 - resolved\par -s/p course of paxlovid \par \par problem 2: abnormal chest CT improved c/w inflammation \par -follow up chest CT (last 10/2021, next 10/2022) \par -confirmed hernia of the chest consistent with prior lung surgery \par CAT scans are the only radiological modality to identify abnormalities w/in the lungs with regards to nodules/masses/lymph nodes. Risks, benefits were reviewed in detail. The guidelines for abnormalities include follow up CT scans at various intervals which could range from 6 weeks to 1 year intervals. If there is a change for the worse then consideration for a biopsy will be considered if you are a candidate. Second opinion evaluation with a thoracic surgeon or an interventional radiologist could be offered. \par \par problem 3: ? ALINE/ ?REM Sleep Dz- poor hygiene\par -complete multiple sleep latency test\par -based on his fatigue, EDS, questionable snoring, and elevated Mallampati class\par -he is being recommended to have an at home sleep study\par -refused in lab Sleep study\par -recommended Oxy-Aid or Provent \par -Discussed the risks/associations with coronary artery disease, atrial fibrillation, arrhythmia, memory loss, issues with concentration, stroke risk, hypertension, nocturia, chronic reflux/[place]’s esophagus some but not all inclusive. Treatment options discussed including CPAP/BiPAP machine, oral appliance, ProVent therapy, Oxy-Aid by Respitec, new technologies, or positional sleep.\par -Treatment options discussed including CPAP/BiPAP machine, oral appliance, ProVent therapy, Oxy-Aid by Respitec, new technologies, or positional sleep.Recommended use of the CPAP machine for moderate (AHI >15), moderate to severe (AHI 15-30) and severe patients (AHI > 30). Recommended weight loss which can reduce AHI especially in weight loss of greater than 5% of BMI. Positional sleep is recommended in those with low AHI, low-moderate BMI, and younger age. For severe sleep apnea, the hypoglossal nerve stimulator was recommended as well.\par \par problem 4: allergic rhinitis (quiet)\par - continue Xyzal 5 mg QHS\par -continue to use Astelin .15 1 sniff each nostril BID\par - continue Nasacort  AM 1 BID  or Flonase 1 sniff/nostril BID\par -continue to use OTC antihistamines / Clarinex 5mg QHS prn\par -Environmental measures for allergies were encouraged including mattress and pillow cover, air purifier, and environmental controls.\par \par problem 5: overweight - improved \par -Weight loss, exercise, and diet control were discussed and are highly encouraged. Treatment options were given such as, aqua therapy, and contacting a nutritionist. Recommended to use the elliptical, stationary bike, less use of treadmill. Obesity is associated with worsening asthma, shortness of breath, and potential for cardiac disease, diabetes, and other underlying medical conditions.\par \par problem 5A: Poor Balance\par -recommended prescription for gait training and balance therapy (refused currently due to COVID anxiety)\par \par problem 6: GERD\par  -continue Omeprazole 40 mg QAM, pre-meal  \par -continue to use Pepcid 40 mg QHS\par -Rule of 2s: avoid eating too much, eating too late, eating too spicy, eating two hours before bed\par -Things to avoid including overeating, spicy foods, tight clothing, eating within three hours of bed, this list is not all inclusive. \par -For treatment of reflux, possible options discussed including diet control, H2 blockers, PPIs, as well as coating motility agents discussed as treatment options. Timing of meals and proximity of last meal to sleep were discussed. If symptoms persist, a formal gastrointestinal evaluation is needed. \par \par problem 7: chest indentation\par -felt to be related to prior surgery\par -he is being recommended to have cardiac therapy\par \par problem 8 : poor breathing mechanics\par -Recommended [place] and [place] breathing techniques \par -Proper breathing techniques were reviewed with an emphasis of exhalation. Patient instructed to breath in for 1 second and out for four seconds. Patient was encouraged to not talk while walking. \par \par Problem 9: Health Maintenance/COVID19 Precautions:- ?Metabolic Syndrome\par -s/p Covid 19 8/2022\par -s/p Moderna COVID 19 vaccine x 4 \par -complete renal evaluation\par -complete Endocrine evaluation\par \par -OTC Vitamin C 500mg BID \par -OTC Quercetin 250-500mg BID \par -OTC Zinc 75-100mg per day \par -OTC Melatonin 1or 2mg a night \par -OTC Vitamin D 1-4000mg per day\par -Tonic Water 8oz\par -Recommended to Stay Hydrated (At least a gallon/day)\par \par problem 10: health maintenance \par -recommended Sanotize anti viral nasal spray in case of viral infection \par -recommended Neuro Renew\par -recommended yearly flu -10/21/2021\par -recommended strep pneumonia vaccines: Prevnar-13 vaccine, followed by Pneumo vaccine 23 one year following (completed) \par -recommended early intervention for URIs\par -recommended regular osteoporosis evaluations\par -recommended early dermatological evaluations\par -recommended after the age of 50 to the age of 70, colonoscopy every 5 years \par \par problem 11: poor sleep hygiene ( in place) \par -recommended to wear sunglasses 30 minutes before bed \par -Good sleep hygiene was encouraged including avoiding watching television an hour before bed, keeping caffeine at a low,  avoiding reading, television, or anything, in bed, no drinking any liquids three hours before bedtime, and only getting into bed when tired and ready for sleep. \par \par F/U in 4 months\par He is encouraged to call with any changes, concerns, or questions. \par

## 2022-10-06 NOTE — PHYSICAL EXAM
[No Acute Distress] : no acute distress [Normal Oropharynx] : normal oropharynx [Normal Appearance] : normal appearance [No Neck Mass] : no neck mass [Normal Rate/Rhythm] : normal rate/rhythm [Normal S1, S2] : normal s1, s2 [No Murmurs] : no murmurs [No Resp Distress] : no resp distress [Clear to Auscultation Bilaterally] : clear to auscultation bilaterally [No Abnormalities] : no abnormalities [Benign] : benign [Normal Gait] : normal gait [No Clubbing] : no clubbing [No Cyanosis] : no cyanosis [FROM] : FROM [1+ Pitting] : 1+ pitting [Normal Color/ Pigmentation] : normal color/ pigmentation [No Focal Deficits] : no focal deficits [Oriented x3] : oriented x3 [Normal Affect] : normal affect [II] : Mallampati Class: II [TextBox_68] : I:E ratio 1:3; clear

## 2022-10-06 NOTE — PROCEDURE
[FreeTextEntry1] : Full PFT revealed normal flows, with a FEV1 of 2.94L, which is 89% of predicted, normal lung volumes, and a diffusion of 17.4, which is 86% of predicted, with a normal flow volume loop \par \par Feno was 37; a normal value being less than 25. Fractional exhaled nitric oxide (FENO) is regarded as a simple, noninvasive method for assessing eosinophilic airway inflammation. Produced by a variety of cells within the lung, nitric oxide (NO) concentrations are generally low in healthy individuals. However, high concentrations of NO appear to be involved in nonspecific host defense mechanisms and chronic inflammatory  diseases such as asthma. The American Thoracic Society (ATS) therefore recommended using FENO to aid in the diagnosis and monitoring of eosinophilic airway inflammation and asthma, and for identifying steroid responsive individuals whose chronic respiratory symptoms may be caused by airway inflammation

## 2022-10-15 ENCOUNTER — APPOINTMENT (OUTPATIENT)
Dept: CT IMAGING | Facility: CLINIC | Age: 77
End: 2022-10-15

## 2022-10-15 ENCOUNTER — OUTPATIENT (OUTPATIENT)
Dept: OUTPATIENT SERVICES | Facility: HOSPITAL | Age: 77
LOS: 1 days | End: 2022-10-15
Payer: MEDICARE

## 2022-10-15 DIAGNOSIS — R93.89 ABNORMAL FINDINGS ON DIAGNOSTIC IMAGING OF OTHER SPECIFIED BODY STRUCTURES: ICD-10-CM

## 2022-10-15 PROCEDURE — 71250 CT THORAX DX C-: CPT

## 2022-10-15 PROCEDURE — 71250 CT THORAX DX C-: CPT | Mod: 26,MH

## 2022-10-20 ENCOUNTER — NON-APPOINTMENT (OUTPATIENT)
Age: 77
End: 2022-10-20

## 2022-11-15 ENCOUNTER — NON-APPOINTMENT (OUTPATIENT)
Age: 77
End: 2022-11-15

## 2023-02-03 ENCOUNTER — APPOINTMENT (OUTPATIENT)
Dept: PULMONOLOGY | Facility: CLINIC | Age: 78
End: 2023-02-03
Payer: MEDICARE

## 2023-02-03 DIAGNOSIS — J30.9 ALLERGIC RHINITIS, UNSPECIFIED: ICD-10-CM

## 2023-02-03 DIAGNOSIS — R93.89 ABNORMAL FINDINGS ON DIAGNOSTIC IMAGING OF OTHER SPECIFIED BODY STRUCTURES: ICD-10-CM

## 2023-02-03 DIAGNOSIS — E66.3 OVERWEIGHT: ICD-10-CM

## 2023-02-03 DIAGNOSIS — U07.1 COVID-19: ICD-10-CM

## 2023-02-03 DIAGNOSIS — R26.89 OTHER ABNORMALITIES OF GAIT AND MOBILITY: ICD-10-CM

## 2023-02-03 DIAGNOSIS — U09.9 POST COVID-19 CONDITION, UNSPECIFIED: ICD-10-CM

## 2023-02-03 DIAGNOSIS — J45.30 MILD PERSISTENT ASTHMA, UNCOMPLICATED: ICD-10-CM

## 2023-02-03 DIAGNOSIS — K21.9 GASTRO-ESOPHAGEAL REFLUX DISEASE W/OUT ESOPHAGITIS: ICD-10-CM

## 2023-02-03 DIAGNOSIS — R06.83 SNORING: ICD-10-CM

## 2023-02-03 DIAGNOSIS — R06.02 SHORTNESS OF BREATH: ICD-10-CM

## 2023-02-03 DIAGNOSIS — R05.3 CHRONIC COUGH: ICD-10-CM

## 2023-02-03 PROCEDURE — 99447 NTRPROF PH1/NTRNET/EHR 11-20: CPT | Mod: CS

## 2023-02-03 NOTE — HISTORY OF PRESENT ILLNESS
[Home] : at home, [unfilled] , at the time of the visit. [Medical Office: ([facility])___] : at the medical office located in  [Verbal consent obtained from patient] : the patient, [unfilled] [FreeTextEntry1] : Mr. Blair is a 77 year old male with a history of asthma, allergy, GERD, abnormal CT, ?OSAS video calls to the office today for a follow up visit . His chief complaint is\par - concerned over his chest CT results and NODIFY \par - he denies any chest pain\par - he notes very rare SOB, he notes not getting SOB while playing pickle ball but might be SOB while getting dressed \par - he notes getting a second dog\par - he notes heartburn and reflux is controlled with Rx\par - he notes thinking he might have long COVID because he still gets "metallic taste" occasionally\par - he denies coughing and wheezing\par - he notes poor sleep pattern \par \par \par - He  denies any headaches, nausea, vomiting, fever, chills, sweats, chest pains, chest pressure, diarrhea, constipation, dysphagia, myalgia, dizziness, leg swelling, leg pain, itchy eyes, itchy ears, heartburn, reflux, or sour taste in the mouth.

## 2023-02-03 NOTE — ADDENDUM
[FreeTextEntry1] : Documented by Rogerio Haddad acting as a scribe for Dr. Sanford Muse on (02/03/2023).\par \par All medical record entries made by the Scribe were at my, Dr. Sanford Muse's, direction and personally dictated by me on (02/03/2023). I have reviewed the chart and agree that the record accurately reflects my personal performance of the history, physical exam, assessment and plan. I have personally directed, reviewed, and agree with the discharge instructions.

## 2023-02-03 NOTE — ASSESSMENT
[FreeTextEntry1] : Mr. Blair is a 77 year old male who has a history of HTN, cervical radiculopathy, CAD s/p stent 11/2021, asthma, allergy, GERD, and ?OSAS. He presents to the office for a pulmonary follow up. He is currently plagued by Knapp (sex/stairs), indeterminate Nodule (Nodify negative), concern over long COVID\par \par His shortness of breath is multifactorial due to:\par -poor mechanics of breathing \par -out of shape / overweight\par -pulmonary disease\par    - Asthma\par    - Abnormal CT \par -cardiac disease -  / [place]\par \par problem 1: Asthma- quiet- Dr. Nobles ([place]) \par -continue Singulair 10 mg QHS\par -continue Symbicort 160 2 inhalations BID PRN \par -Asthma is believed to be caused by inherited (genetic) and environmental factor, but its exact cause is unknown. Asthma may be triggered by allergens, lung infections, or irritants in the air. Asthma triggers are different for each person\par -Inhaler technique reviewed as well as oral hygiene techniques reviewed with patient. Avoidance of cold air, extremes of temperature, rescue inhaler should be used before exercise. Order of medication reviewed with patient. Recommended use of a cool mist humidifier in the bedroom.\par \par Problem 1A: Cardiac (CAD) -? active 10/2022 (exertion)\par -recommended follow up with cardiologist Dr Darnell Puente /  (Stent / Stemi 11/2021)  - revisit stress test\par \par Problem 1B: Chest Pain - ?MS\par - recommended Topricin cream \par \par Problem 1C: Covid 19 8/2022 - resolved\par -s/p course of paxlovid \par \par problem 2: abnormal chest CT improved c/w inflammation Nodify negative (10/2022)\par -follow up chest CT (last 10/2021, 10/2022) - Next 4/2023\par -confirmed hernia of the chest consistent with prior lung surgery \par CAT scans are the only radiological modality to identify abnormalities w/in the lungs with regards to nodules/masses/lymph nodes. Risks, benefits were reviewed in detail. The guidelines for abnormalities include follow up CT scans at various intervals which could range from 6 weeks to 1 year intervals. If there is a change for the worse then consideration for a biopsy will be considered if you are a candidate. Second opinion evaluation with a thoracic surgeon or an interventional radiologist could be offered. \par \par problem 3: ? ALINE/ ?REM Sleep Dz- poor hygiene\par -complete multiple sleep latency test\par -based on his fatigue, EDS, questionable snoring, and elevated Mallampati class\par -he is being recommended to have an at home sleep study\par -refused in lab Sleep study\par -recommended Oxy-Aid or Provent \par -Discussed the risks/associations with coronary artery disease, atrial fibrillation, arrhythmia, memory loss, issues with concentration, stroke risk, hypertension, nocturia, chronic reflux/[place]’s esophagus some but not all inclusive. Treatment options discussed including CPAP/BiPAP machine, oral appliance, ProVent therapy, Oxy-Aid by [employer], new technologies, or positional sleep.\par -Treatment options discussed including CPAP/BiPAP machine, oral appliance, ProVent therapy, Oxy-Aid by [employer], new technologies, or positional sleep.Recommended use of the CPAP machine for moderate (AHI >15), moderate to severe (AHI 15-30) and severe patients (AHI > 30). Recommended weight loss which can reduce AHI especially in weight loss of greater than 5% of BMI. Positional sleep is recommended in those with low AHI, low-moderate BMI, and younger age. For severe sleep apnea, the hypoglossal nerve stimulator was recommended as well.\par \par problem 4: allergic rhinitis (quiet)\par - continue Xyzal 5 mg QHS\par -continue to use Astelin .15 1 sniff each nostril BID\par - continue Nasacort  AM 1 BID  or Flonase 1 sniff/nostril BID\par -continue to use OTC antihistamines / Clarinex 5mg QHS prn\par -Environmental measures for allergies were encouraged including mattress and pillow cover, air purifier, and environmental controls.\par \par problem 5: overweight - improved \par -Weight loss, exercise, and diet control were discussed and are highly encouraged. Treatment options were given such as, aqua therapy, and contacting a nutritionist. Recommended to use the elliptical, stationary bike, less use of treadmill. Obesity is associated with worsening asthma, shortness of breath, and potential for cardiac disease, diabetes, and other underlying medical conditions.\par \par problem 5A: Poor Balance\par -recommended prescription for gait training and balance therapy (refused currently due to COVID anxiety)\par \par problem 6: GERD\par  -continue Omeprazole 40 mg QAM, pre-meal  \par -continue to use Pepcid 40 mg QHS\par -Rule of 2s: avoid eating too much, eating too late, eating too spicy, eating two hours before bed\par -Things to avoid including overeating, spicy foods, tight clothing, eating within three hours of bed, this list is not all inclusive. \par -For treatment of reflux, possible options discussed including diet control, H2 blockers, PPIs, as well as coating motility agents discussed as treatment options. Timing of meals and proximity of last meal to sleep were discussed. If symptoms persist, a formal gastrointestinal evaluation is needed. \par \par problem 7: chest indentation\par -felt to be related to prior surgery\par -he is being recommended to have cardiac therapy\par \par problem 8 : poor breathing mechanics\par -Recommended Wim Hof and Buteyko breathing techniques \par -Proper breathing techniques were reviewed with an emphasis of exhalation. Patient instructed to breath in for 1 second and out for four seconds. Patient was encouraged to not talk while walking. \par \par problem 9: Long COVID-19 Sx\par - Allegra 180 mg in the morning\par - Benadryl 25 mg at night\par - Low histamine diet \par - Perque detox one month \par \par Problem 10: Health Maintenance/COVID19 Precautions:- ?Metabolic Syndrome\par -s/p Covid 19 8/2022\par -s/p Moderna COVID 19 vaccine x 4 \par -complete renal evaluation\par -complete Endocrine evaluation\par \par -OTC Vitamin C 500mg BID \par -OTC Quercetin 250-500mg BID \par -OTC Zinc 75-100mg per day \par -OTC Melatonin 1or 2mg a night \par -OTC Vitamin D 1-4000mg per day\par -Tonic Water 8oz\par -Recommended to Stay Hydrated (At least a gallon/day)\par \par problem 11: health maintenance \par -recommended Sanotize anti viral nasal spray in case of viral infection \par -recommended Neuro Renew\par -recommended yearly flu -10/21/2021\par -recommended strep pneumonia vaccines: Prevnar-13 vaccine, followed by Pneumo vaccine 23 one year following (completed) \par -recommended early intervention for URIs\par -recommended regular osteoporosis evaluations\par -recommended early dermatological evaluations\par -recommended after the age of 50 to the age of 70, colonoscopy every 5 years \par \par problem 12: poor sleep hygiene ( in place) \par -recommended to wear sunglasses 30 minutes before bed \par -Good sleep hygiene was encouraged including avoiding watching television an hour before bed, keeping caffeine at a low,  avoiding reading, television, or anything, in bed, no drinking any liquids three hours before bedtime, and only getting into bed when tired and ready for sleep. \par \par F/U in 4 months\par He is encouraged to call with any changes, concerns, or questions. \par

## 2023-02-03 NOTE — REASON FOR VISIT
[Follow-Up] : a follow-up visit [FreeTextEntry1] : telephonic- SOB, Asthma, allergy, GERD, abnormal CT, ?OSAS, Covid 19 8/2022

## 2023-10-26 ENCOUNTER — RX RENEWAL (OUTPATIENT)
Age: 78
End: 2023-10-26

## 2023-11-07 ENCOUNTER — RX RENEWAL (OUTPATIENT)
Age: 78
End: 2023-11-07